# Patient Record
Sex: FEMALE | Race: WHITE | Employment: UNEMPLOYED | URBAN - METROPOLITAN AREA
[De-identification: names, ages, dates, MRNs, and addresses within clinical notes are randomized per-mention and may not be internally consistent; named-entity substitution may affect disease eponyms.]

---

## 2018-08-08 ENCOUNTER — HOSPITAL ENCOUNTER (OUTPATIENT)
Dept: NON INVASIVE DIAGNOSTICS | Age: 28
Discharge: HOME | End: 2018-08-08
Attending: INTERNAL MEDICINE | Admitting: INTERNAL MEDICINE

## 2018-08-08 DIAGNOSIS — M54.5 LOW BACK PAIN, UNSPECIFIED BACK PAIN LATERALITY, UNSPECIFIED CHRONICITY, WITH SCIATICA PRESENCE UNSPECIFIED: ICD-10-CM

## 2018-09-07 ENCOUNTER — HOSPITAL ENCOUNTER (OUTPATIENT)
Dept: MRI IMAGING | Age: 28
Discharge: HOME | End: 2018-09-07
Attending: INTERNAL MEDICINE | Admitting: INTERNAL MEDICINE

## 2018-09-07 DIAGNOSIS — M54.9 DORSALGIA: ICD-10-CM

## 2018-09-07 DIAGNOSIS — M54.9 BACK PAIN, UNSPECIFIED BACK LOCATION, UNSPECIFIED BACK PAIN LATERALITY, UNSPECIFIED CHRONICITY: ICD-10-CM

## 2018-09-12 ENCOUNTER — TELEPHONE (OUTPATIENT)
Dept: ORTHOPEDIC SURGERY | Age: 28
End: 2018-09-12

## 2018-09-26 ENCOUNTER — TELEPHONE (OUTPATIENT)
Dept: ORTHOPEDIC SURGERY | Age: 28
End: 2018-09-26

## 2018-09-26 ENCOUNTER — OFFICE VISIT (OUTPATIENT)
Dept: ORTHOPEDIC SURGERY | Age: 28
End: 2018-09-26
Payer: COMMERCIAL

## 2018-09-26 VITALS
SYSTOLIC BLOOD PRESSURE: 106 MMHG | HEART RATE: 74 BPM | WEIGHT: 166 LBS | HEIGHT: 64 IN | BODY MASS INDEX: 28.34 KG/M2 | DIASTOLIC BLOOD PRESSURE: 71 MMHG

## 2018-09-26 DIAGNOSIS — S32.2XXA CLOSED FRACTURE OF SACRUM AND COCCYX, INITIAL ENCOUNTER (HCC): Primary | ICD-10-CM

## 2018-09-26 DIAGNOSIS — S32.10XA CLOSED FRACTURE OF SACRUM AND COCCYX, INITIAL ENCOUNTER (HCC): Primary | ICD-10-CM

## 2018-09-26 PROCEDURE — 99243 OFF/OP CNSLTJ NEW/EST LOW 30: CPT | Performed by: NURSE PRACTITIONER

## 2018-09-26 PROCEDURE — G8419 CALC BMI OUT NRM PARAM NOF/U: HCPCS | Performed by: NURSE PRACTITIONER

## 2018-09-26 PROCEDURE — G8427 DOCREV CUR MEDS BY ELIG CLIN: HCPCS | Performed by: NURSE PRACTITIONER

## 2018-09-26 RX ORDER — IBUPROFEN 200 MG
600 TABLET ORAL 2 TIMES DAILY WITH MEALS
Qty: 60 TABLET | Refills: 1 | Status: SHIPPED | OUTPATIENT
Start: 2018-09-26 | End: 2018-10-27 | Stop reason: SDUPTHER

## 2018-09-26 RX ORDER — HYDROCODONE BITARTRATE AND ACETAMINOPHEN 5; 325 MG/1; MG/1
1 TABLET ORAL 2 TIMES DAILY PRN
Qty: 14 TABLET | Refills: 0 | Status: SHIPPED | OUTPATIENT
Start: 2018-09-26 | End: 2018-10-03

## 2018-10-27 ENCOUNTER — HOSPITAL ENCOUNTER (EMERGENCY)
Age: 28
Discharge: HOME | End: 2018-10-27
Attending: EMERGENCY MEDICINE
Payer: COMMERCIAL

## 2018-10-27 ENCOUNTER — APPOINTMENT (OUTPATIENT)
Dept: GENERAL RADIOLOGY | Age: 28
End: 2018-10-27
Payer: COMMERCIAL

## 2018-10-27 VITALS
HEART RATE: 60 BPM | DIASTOLIC BLOOD PRESSURE: 66 MMHG | WEIGHT: 170.42 LBS | OXYGEN SATURATION: 98 % | RESPIRATION RATE: 18 BRPM | HEIGHT: 64 IN | BODY MASS INDEX: 29.09 KG/M2 | TEMPERATURE: 97.8 F | SYSTOLIC BLOOD PRESSURE: 105 MMHG

## 2018-10-27 DIAGNOSIS — S80.02XA CONTUSION OF LEFT KNEE, INITIAL ENCOUNTER: ICD-10-CM

## 2018-10-27 DIAGNOSIS — M54.5 LOW BACK PAIN, UNSPECIFIED BACK PAIN LATERALITY, UNSPECIFIED CHRONICITY, WITH SCIATICA PRESENCE UNSPECIFIED: ICD-10-CM

## 2018-10-27 DIAGNOSIS — S20.219A CONTUSION, CHEST WALL, UNSPECIFIED LATERALITY, INITIAL ENCOUNTER: Primary | ICD-10-CM

## 2018-10-27 PROCEDURE — 71046 X-RAY EXAM CHEST 2 VIEWS: CPT

## 2018-10-27 PROCEDURE — 73562 X-RAY EXAM OF KNEE 3: CPT

## 2018-10-27 PROCEDURE — 99283 EMERGENCY DEPT VISIT LOW MDM: CPT

## 2018-10-27 ASSESSMENT — PAIN DESCRIPTION - LOCATION
LOCATION: RIB CAGE
LOCATION: RIB CAGE
LOCATION_2: KNEE
LOCATION_2: KNEE

## 2018-10-27 ASSESSMENT — PAIN DESCRIPTION - ORIENTATION
ORIENTATION_2: LEFT
ORIENTATION: LEFT
ORIENTATION_2: LEFT

## 2018-10-27 ASSESSMENT — PAIN DESCRIPTION - PAIN TYPE: TYPE: ACUTE PAIN

## 2018-10-27 ASSESSMENT — PAIN SCALES - GENERAL
PAINLEVEL_OUTOF10: 10
PAINLEVEL_OUTOF10: 10

## 2018-10-27 ASSESSMENT — PAIN DESCRIPTION - INTENSITY
RATING_2: 5
RATING_2: 5

## 2018-10-27 NOTE — ED TRIAGE NOTES
Last night she slipped on some wooden steps outside her porch. The steps were wet. She is having left sided rib pain in the front. Also having left knee pain on the medial aspect of her left knee. No bruising or impaired skin integrity of her left knee or chest wall below her sports bra.

## 2018-10-27 NOTE — ED PROVIDER NOTES
the ED, diagnosis, care, and prognosis. The plan is to discharge to home. Patient is in agreement with plan and questions have been answered. I also discussed with Shefali Leon the reasons which may require a return visit and the importance of follow-up care. The patient is well-appearing, nontoxic, and improved at the time of discharge. Patient agrees to call to arrange follow-up care as directed. Shefali Leon understands to return immediately for worsening/change in symptoms. Patient will be started on the following medications from the ED:  Discharge Medication List as of 10/27/2018  3:55 PM            Disposition  Pt is discharged in stable condition.     Disposition Vitals:  /66   Pulse 60   Temp 97.8 °F (36.6 °C) (Oral)   Resp 18   Ht 5' 4\" (1.626 m)   Wt 170 lb 6.7 oz (77.3 kg)   LMP 10/01/2018   SpO2 98%   BMI 29.25 kg/m²           Ludin Chanell,   10/27/18 [address] [facility],   11/05/18 [facility] -FS stable  -c/w Accuchecks ACHSTID   -c/w Diabetic consistent carb diet  -c/w Humalog sliding scale

## 2018-12-13 ENCOUNTER — HOSPITAL ENCOUNTER (OUTPATIENT)
Dept: ULTRASOUND IMAGING | Age: 28
Discharge: HOME | End: 2018-12-13
Payer: COMMERCIAL

## 2018-12-13 DIAGNOSIS — N92.0 MENORRHAGIA WITH REGULAR CYCLE: ICD-10-CM

## 2018-12-13 PROCEDURE — 76856 US EXAM PELVIC COMPLETE: CPT

## 2018-12-13 PROCEDURE — 76830 TRANSVAGINAL US NON-OB: CPT

## 2019-01-08 ENCOUNTER — HOSPITAL ENCOUNTER (OUTPATIENT)
Dept: ULTRASOUND IMAGING | Age: 29
Discharge: HOME | End: 2019-01-08
Payer: COMMERCIAL

## 2019-01-08 DIAGNOSIS — R10.84 GENERALIZED ABDOMINAL PAIN: ICD-10-CM

## 2019-01-08 PROCEDURE — 76700 US EXAM ABDOM COMPLETE: CPT

## 2019-06-22 ENCOUNTER — HOSPITAL ENCOUNTER (EMERGENCY)
Age: 29
Discharge: HOME | End: 2019-06-22
Payer: COMMERCIAL

## 2019-06-22 VITALS
SYSTOLIC BLOOD PRESSURE: 128 MMHG | DIASTOLIC BLOOD PRESSURE: 82 MMHG | HEART RATE: 74 BPM | RESPIRATION RATE: 16 BRPM | BODY MASS INDEX: 30.74 KG/M2 | HEIGHT: 63 IN | OXYGEN SATURATION: 100 % | TEMPERATURE: 98.7 F | WEIGHT: 173.5 LBS

## 2019-06-22 DIAGNOSIS — S61.412A LACERATION OF LEFT HAND WITHOUT FOREIGN BODY, INITIAL ENCOUNTER: Primary | ICD-10-CM

## 2019-06-22 PROCEDURE — 4500000022 HC ED LEVEL 2 PROCEDURE

## 2019-06-22 PROCEDURE — 99282 EMERGENCY DEPT VISIT SF MDM: CPT

## 2019-06-22 RX ORDER — CEPHALEXIN 500 MG/1
500 CAPSULE ORAL 3 TIMES DAILY
Qty: 21 CAPSULE | Refills: 0 | Status: SHIPPED | OUTPATIENT
Start: 2019-06-22 | End: 2019-06-29

## 2019-06-22 ASSESSMENT — PAIN DESCRIPTION - LOCATION
LOCATION: OTHER (COMMENT)
LOCATION: OTHER (COMMENT)

## 2019-06-22 ASSESSMENT — PAIN SCALES - GENERAL
PAINLEVEL_OUTOF10: 10
PAINLEVEL_OUTOF10: 10

## 2019-06-22 ASSESSMENT — PAIN DESCRIPTION - FREQUENCY
FREQUENCY: CONTINUOUS
FREQUENCY: CONTINUOUS

## 2019-06-22 ASSESSMENT — PAIN DESCRIPTION - DESCRIPTORS
DESCRIPTORS: SHARP
DESCRIPTORS: SHARP

## 2019-06-22 ASSESSMENT — PAIN DESCRIPTION - PAIN TYPE: TYPE: ACUTE PAIN

## 2019-06-22 ASSESSMENT — PAIN DESCRIPTION - ORIENTATION
ORIENTATION: LEFT
ORIENTATION: LEFT

## 2019-06-23 NOTE — ED NOTES
Laceration sutured per P.A. Discharge  Instructions given voiced understanding  velcro thumb splint placed per P.A. Instructions.  Pt said pain still [id number] [place] [address], RN  06/22/19 2036

## 2019-06-23 NOTE — ED PROVIDER NOTES
[address] [employer]      Pt Name: Jacqueline Holcomb  MRN: [id number]  [place] 1990  Date of evaluation: 6/22/2019  Provider: LEAH Bonner    The ED Attending Physician was available for consultation but did not see or evaluate this patient. CHIEF COMPLAINT       Chief Complaint   Patient presents with    Laceration     pt said she was putting down jo-ann  cut thumb with a razor       HISTORY OF PRESENT ILLNESS  (Location/Symptom, Timing/Onset, Context/Setting, Quality, Duration, Modifying Factors, Severity.)   Jacqueline Holcomb is a 33 y.o. female who presents to the emergency department with a complaint of a laceration to the left hand. Patient says that very shortly before arrival to the emergency department she cut her hand with the blade of a . She says there was a lot of bleeding, and the cut is deep, and is located on the palm of the hand around the base of the thumb. She says she put pressure on it but it still bleeding. She reports that she is unable to move the thumb, but it is not numb. Denies any prior history of significant injury, surgery or fracture to the affected hand. Denies injuries to any other parts of the body. Denies any relevant medical problems. Says her last tetanus vaccination was last year. No other complaints. Nursing Notes were reviewed and I agree. REVIEW OF SYSTEMS    (2-9 systems for level 4, 10 or more for level 5)     Constitutional:  Negative for fever, chills. Respiratory:  Negative for cough, shortness of breath. Cardiovascular:  Negative for chest pain, palpitations. Gastrointestinal:  Negative for nausea, vomiting, abdominal pain. Genitourinary:  Negative for dysuria, hematuria, flank pain, and pelvic pain. Musculoskeletal: Positive for left hand laceration. Negative for myalgias, neck pain and neck stiffness.    Neurological:  Negative for dizziness, weakness, light-headedness, numbness and headaches. Except as noted above the remainder of the review of systems was reviewed and negative. PAST MEDICAL HISTORY         Diagnosis Date    Anxiety     Depression        SURGICAL HISTORY           Procedure Laterality Date     SECTION      TUBAL LIGATION         CURRENT MEDICATIONS       Previous Medications    No medications on file       ALLERGIES     Patient has no known allergies. FAMILY HISTORY     History reviewed. No pertinent family history. No family status information on file. SOCIAL HISTORY      reports that she is a non-smoker but has been exposed to tobacco smoke. She has never used smokeless tobacco. She reports that she does not drink alcohol or use drugs. PHYSICAL EXAM    (up to 7 for level 4, 8 or more for level 5)     ED Triage Vitals [19 1915]   BP Temp Temp Source Pulse Resp SpO2 Height Weight   128/82 98.7 °F (37.1 °C) Oral 74 16 100 % 5' 3\" (1.6 m) 173 lb 8 oz (78.7 kg)       Constitutional:  Appearing well-developed and well-nourished. No distress. HENT:  Normocephalic and atraumatic. Cardiovascular:  Normal rate, regular rhythm, normal heart sounds and intact distal pulses. Pulmonary/Chest:  Effort normal and breath sounds normal. No respiratory distress. Musculoskeletal: Approximately 5 cm curved laceration noted to the left hand over the thenar eminence and the first MCP joint, with muscle involvement but negative for bony exposure. Nearly absent flexion and extension strength at both the MCP joint and the interphalangeal joint of the thumb. Sensation to light touch grossly intact and capillary refill <3 seconds in the left thumb. Neurological:  Oriented to person, place, and time. No cranial nerve deficit. Skin:  Skin is warm and dry. Not diaphoretic. Psychiatric:  Normal mood, affect, behavior, judgment and thought content.            DIAGNOSTIC RESULTS     RADIOLOGY:   Non-plain film images such as left hand, as noted above. The surrounding area was cleaned with an alcohol swab and anesthetized in a field block by injection with equal parts 1% lidocaine and 0.5% bupivicaine through a 27-gauge needle. The laceration was then thoroughly cleaned with surgical scrub, flushed with sterile saline, and sterilely draped. No foreign bodies were noted. The laceration was sutured with close approximation using 5-0 Prolene with 7 sutures in a simple interrupted technique. The patient tolerated the procedure well. The patient was given wound care instructions and advised to have the sutures removed in 7 days by a primary care provider, at this emergency department, or at an urgent care center, unless otherwise instructed by the hand specialist. The patient was also advised of the signs of infection such as fever, increased redness and swelling, or pus discharge that would warrant a return to the emergency department. FINAL IMPRESSION      1.  Laceration of left hand without foreign body, initial encounter          DISPOSITION/PLAN   DISPOSITION Decision To Discharge 06/22/2019 08:21:50 PM      PATIENT REFERRED TO:  Ed Mchugh MD  [address] [place] [address]  [phone]    Call in 2 days  For orthopedic hand specialty follow-up care      DISCHARGE MEDICATIONS:  New Prescriptions    CEPHALEXIN (KEFLEX) 500 MG CAPSULE    Take 1 capsule by mouth 3 times daily for 7 days       (Please note that portions of this note were completed with a voice recognition program.  Efforts were made to edit the dictations but occasionally words are mis-transcribed.)    Estephania Ramirez 86, [address] Donald Goodman  06/22/19 800 [facility], [facility] Donald Goodman  06/22/19 9416

## 2020-01-16 ENCOUNTER — APPOINTMENT (OUTPATIENT)
Dept: CT IMAGING | Age: 30
End: 2020-01-16
Payer: COMMERCIAL

## 2020-01-16 ENCOUNTER — HOSPITAL ENCOUNTER (EMERGENCY)
Age: 30
Discharge: HOME | End: 2020-01-16
Attending: EMERGENCY MEDICINE
Payer: COMMERCIAL

## 2020-01-16 VITALS
WEIGHT: 180.56 LBS | BODY MASS INDEX: 31.99 KG/M2 | HEART RATE: 60 BPM | SYSTOLIC BLOOD PRESSURE: 117 MMHG | OXYGEN SATURATION: 99 % | DIASTOLIC BLOOD PRESSURE: 62 MMHG | HEIGHT: 63 IN | RESPIRATION RATE: 14 BRPM | TEMPERATURE: 97.8 F

## 2020-01-16 LAB
ANION GAP SERPL CALCULATED.3IONS-SCNC: 10 MMOL/L (ref 3–16)
BASOPHILS ABSOLUTE: 0 K/UL (ref 0–0.2)
BASOPHILS RELATIVE PERCENT: 0.8 %
BUN BLDV-MCNC: 13 MG/DL (ref 7–20)
CALCIUM SERPL-MCNC: 9.2 MG/DL (ref 8.3–10.6)
CHLORIDE BLD-SCNC: 106 MMOL/L (ref 99–110)
CO2: 27 MMOL/L (ref 21–32)
CREAT SERPL-MCNC: 0.8 MG/DL (ref 0.6–1.1)
EOSINOPHILS ABSOLUTE: 0.5 K/UL (ref 0–0.6)
EOSINOPHILS RELATIVE PERCENT: 8.1 %
GFR AFRICAN AMERICAN: >60
GFR NON-AFRICAN AMERICAN: >60
GLUCOSE BLD-MCNC: 117 MG/DL (ref 70–99)
HCG QUALITATIVE: NEGATIVE
HCT VFR BLD CALC: 36.6 % (ref 36–48)
HEMOGLOBIN: 12.2 G/DL (ref 12–16)
LYMPHOCYTES ABSOLUTE: 1.2 K/UL (ref 1–5.1)
LYMPHOCYTES RELATIVE PERCENT: 21.2 %
MCH RBC QN AUTO: 30.5 PG (ref 26–34)
MCHC RBC AUTO-ENTMCNC: 33.3 G/DL (ref 31–36)
MCV RBC AUTO: 91.7 FL (ref 80–100)
MONOCYTES ABSOLUTE: 0.6 K/UL (ref 0–1.3)
MONOCYTES RELATIVE PERCENT: 10.3 %
NEUTROPHILS ABSOLUTE: 3.3 K/UL (ref 1.7–7.7)
NEUTROPHILS RELATIVE PERCENT: 59.6 %
PDW BLD-RTO: 12.8 % (ref 12.4–15.4)
PLATELET # BLD: 186 K/UL (ref 135–450)
PMV BLD AUTO: 8.9 FL (ref 5–10.5)
POTASSIUM SERPL-SCNC: 4 MMOL/L (ref 3.5–5.1)
RBC # BLD: 3.99 M/UL (ref 4–5.2)
SODIUM BLD-SCNC: 143 MMOL/L (ref 136–145)
WBC # BLD: 5.6 K/UL (ref 4–11)

## 2020-01-16 PROCEDURE — 6370000000 HC RX 637 (ALT 250 FOR IP): Performed by: EMERGENCY MEDICINE

## 2020-01-16 PROCEDURE — 85025 COMPLETE CBC W/AUTO DIFF WBC: CPT

## 2020-01-16 PROCEDURE — 6360000002 HC RX W HCPCS: Performed by: EMERGENCY MEDICINE

## 2020-01-16 PROCEDURE — 80048 BASIC METABOLIC PNL TOTAL CA: CPT

## 2020-01-16 PROCEDURE — 70450 CT HEAD/BRAIN W/O DYE: CPT

## 2020-01-16 PROCEDURE — 96375 TX/PRO/DX INJ NEW DRUG ADDON: CPT

## 2020-01-16 PROCEDURE — 36415 COLL VENOUS BLD VENIPUNCTURE: CPT

## 2020-01-16 PROCEDURE — 84703 CHORIONIC GONADOTROPIN ASSAY: CPT

## 2020-01-16 PROCEDURE — 96374 THER/PROPH/DIAG INJ IV PUSH: CPT

## 2020-01-16 PROCEDURE — 99284 EMERGENCY DEPT VISIT MOD MDM: CPT

## 2020-01-16 PROCEDURE — 2580000003 HC RX 258: Performed by: EMERGENCY MEDICINE

## 2020-01-16 PROCEDURE — 96361 HYDRATE IV INFUSION ADD-ON: CPT

## 2020-01-16 RX ORDER — 0.9 % SODIUM CHLORIDE 0.9 %
1000 INTRAVENOUS SOLUTION INTRAVENOUS ONCE
Status: COMPLETED | OUTPATIENT
Start: 2020-01-16 | End: 2020-01-16

## 2020-01-16 RX ORDER — METOCLOPRAMIDE HYDROCHLORIDE 5 MG/ML
5 INJECTION INTRAMUSCULAR; INTRAVENOUS ONCE
Status: COMPLETED | OUTPATIENT
Start: 2020-01-16 | End: 2020-01-16

## 2020-01-16 RX ORDER — BUTALBITAL, ACETAMINOPHEN AND CAFFEINE 50; 325; 40 MG/1; MG/1; MG/1
1 TABLET ORAL EVERY 6 HOURS PRN
Qty: 12 TABLET | Refills: 0 | Status: SHIPPED | OUTPATIENT
Start: 2020-01-16 | End: 2020-06-07

## 2020-01-16 RX ORDER — KETOROLAC TROMETHAMINE 30 MG/ML
15 INJECTION, SOLUTION INTRAMUSCULAR; INTRAVENOUS ONCE
Status: COMPLETED | OUTPATIENT
Start: 2020-01-16 | End: 2020-01-16

## 2020-01-16 RX ORDER — DIPHENHYDRAMINE HYDROCHLORIDE 50 MG/ML
12.5 INJECTION INTRAMUSCULAR; INTRAVENOUS ONCE
Status: COMPLETED | OUTPATIENT
Start: 2020-01-16 | End: 2020-01-16

## 2020-01-16 RX ORDER — BUTALBITAL, ACETAMINOPHEN AND CAFFEINE 50; 325; 40 MG/1; MG/1; MG/1
1 TABLET ORAL ONCE
Status: COMPLETED | OUTPATIENT
Start: 2020-01-16 | End: 2020-01-16

## 2020-01-16 RX ADMIN — BUTALBITAL, ACETAMINOPHEN, AND CAFFEINE 1 TABLET: 50; 325; 40 TABLET ORAL at 13:22

## 2020-01-16 RX ADMIN — METOCLOPRAMIDE 5 MG: 5 INJECTION, SOLUTION INTRAMUSCULAR; INTRAVENOUS at 12:05

## 2020-01-16 RX ADMIN — SODIUM CHLORIDE 1000 ML: 9 INJECTION, SOLUTION INTRAVENOUS at 12:05

## 2020-01-16 RX ADMIN — SODIUM CHLORIDE 1000 ML: 9 INJECTION, SOLUTION INTRAVENOUS at 13:09

## 2020-01-16 RX ADMIN — KETOROLAC TROMETHAMINE 15 MG: 30 INJECTION, SOLUTION INTRAMUSCULAR at 13:09

## 2020-01-16 RX ADMIN — DIPHENHYDRAMINE HYDROCHLORIDE 12.5 MG: 50 INJECTION, SOLUTION INTRAMUSCULAR; INTRAVENOUS at 12:05

## 2020-01-16 ASSESSMENT — PAIN SCALES - GENERAL
PAINLEVEL_OUTOF10: 9
PAINLEVEL_OUTOF10: 5
PAINLEVEL_OUTOF10: 9
PAINLEVEL_OUTOF10: 7
PAINLEVEL_OUTOF10: 5
PAINLEVEL_OUTOF10: 10

## 2020-01-16 ASSESSMENT — PAIN DESCRIPTION - ONSET: ONSET: GRADUAL

## 2020-01-16 ASSESSMENT — PAIN DESCRIPTION - PROGRESSION: CLINICAL_PROGRESSION: GRADUALLY WORSENING

## 2020-01-16 ASSESSMENT — PAIN DESCRIPTION - LOCATION: LOCATION: HEAD

## 2020-01-16 ASSESSMENT — PAIN DESCRIPTION - DESCRIPTORS: DESCRIPTORS: ACHING

## 2020-01-16 NOTE — ED NOTES
Pt d/c home with AVS no s.s of distress started, script in hand pain 5/10 pt agreeable with pain management      Jos Hinojosa RN  01/16/20 9881

## 2020-01-16 NOTE — ED PROVIDER NOTES
on file     Emotionally abused: Not on file     Physically abused: Not on file     Forced sexual activity: Not on file   Other Topics Concern    Not on file   Social History Narrative    Not on file     Current Facility-Administered Medications   Medication Dose Route Frequency Provider Last Rate Last Dose    0.9 % sodium chloride bolus  1,000 mL Intravenous Once Phill Schuster MD        metoclopramide (REGLAN) injection 5 mg  5 mg Intravenous Once Phill Schuster MD        diphenhydrAMINE (BENADRYL) injection 12.5 mg  12.5 mg Intravenous Once Phill Schuster MD         No current outpatient medications on file. No Known Allergies    [unfilled]    Nursing Notes Reviewed    Physical Exam:  Vitals:    01/16/20 1117   BP: 115/75   Pulse: 76   Resp: 14   Temp: 97.8 °F (36.6 °C)   SpO2: 97%       GENERAL APPEARANCE: Awake and alert. Cooperative. No acute distress. HEAD: Normocephalic. Atraumatic. EYES: EOM's grossly intact. Sclera anicteric. ENT: Mucous membranes are moist. Tolerates saliva. No trismus. NECK: Supple. No meningismus. Trachea midline. HEART: RRR. Radial pulses 2+. LUNGS: Respirations unlabored. CTAB  ABDOMEN: Soft. Non-tender. No guarding or rebound. EXTREMITIES: No acute deformities. SKIN: Warm and dry. NEUROLOGICAL: No gross facial drooping. Moves all 4 extremities spontaneously. PSYCHIATRIC: Normal mood. I have reviewed and interpreted all of the currently available lab results from this visit (if applicable):  No results found for this visit on 01/16/20.      Radiographs (if obtained):  [] The following radiograph was interpreted by myself in the absence of a radiologist:  [x] Radiologist's Report Reviewed:  Narrative   EXAMINATION:   CT OF THE HEAD WITHOUT CONTRAST  1/16/2020 12:05 pm       TECHNIQUE:   CT of the head was performed without the administration of intravenous   contrast. Dose modulation, iterative reconstruction, and/or weight based   adjustment of the mA/kV was utilized to reduce the radiation dose to as low   as reasonably achievable.       COMPARISON:   None.       HISTORY:   ORDERING SYSTEM PROVIDED HISTORY: HA       Reason for Exam: PT. C/O RADIATING RT. SIDE HEAD HA THAT RADIATES TO BACK RT. SIDE HEAD X 9 DAYS. DENIES INJURY TO HEAD   Acuity: Acute   Type of Exam: Initial   Relevant Medical/Surgical History: PT. DENIES ANY HX OF HEAD PROBLEMS, DENIES   ANY HX OF SURGERY TO HEAD, DENIES ANY HX OF CA       FINDINGS:   BRAIN/VENTRICLES: No acute intracranial hemorrhage, mass effect or midline   shift.  No abnormal extra-axial fluid collection.  The gray-white   differentiation is maintained without evidence of an acute infarct.  No   evidence of hydrocephalus.       ORBITS: The visualized portion of the orbits demonstrate no acute abnormality.       SINUSES: The visualized paranasal sinuses and mastoid air cells appear clear.       SOFT TISSUES/SKULL:  No acute abnormality of the visualized skull or soft   tissues.           Impression   Unremarkable noncontrast CT examination of the brain.             EKG (if obtained): (All EKG's are interpreted by myself in the absence of a cardiologist)  Initial EKG on my interpretation shows n/a    MDM:  Differential diagnosis: Headache, meningitis, ICH, SAH, pregnancy    Reglan/Benadryl and IV fluid given  Preg: neg   CBC/CMP wnl   CT head: no acute abn    Characteristics of this patient's headache a very benign. CT was done. It showed no acute abnormality of an emergent process. I told her that in order to complete the work-up a lumbar puncture would be recommended to rule out brain bleed and or subarachnoid hemorrhage. She declines this. She feels better after meds/IVF. I vocalized that by declining lumbar puncture there leaves a small chance that a life-threatening process can be present. She voices understanding of the joint decision making we will forego LP.   I estimate there is LOW risk for SUBARACHNOID HEMORRHAGE, MENINGITIS, INTRACRANIAL HEMORRHAGE, ENCEPHALITIS, TEMPORAL ARTERITIS, PSEUDOTUMOR CEREBIR, ACUTE GLAUCOMA, SUBDURAL OR EPIDURAL HEMATOMA, OR STROKE, PULMONARY EMBOLISM, ACUTE CORONARY SYNDROME, OR THORACIC AORTIC DISSECTION, thus I consider the discharge disposition reasonable. Discussed results, diagnosis and plan with patient and/or family. Questions addressed. Disposition and follow-up agreed upon. Specific discharge instructions explained. The patient and/or family and I have discussed the diagnosis and risks, and we agree with discharging home to follow-up with their primary care, specialist or referral doctor. In the event that medications were prescribed the risk profile of these medications were detailed expressly. We also discussed returning to the Emergency Department immediately if new or worsening symptoms occur. We have discussed the symptoms which are most concerning that necessitate immediate return.     Will DC with Fioricet prescription. Old records reviewed. Labs and imaging reviewed and results discussed with patient. Patient was given scripts for the following medications. I counseled patient how to take these medications. New Prescriptions    No medications on file         CRITICAL CARE TIME   Total Critical Care time was 0 minutes, excluding separately reportable procedures. There was a high probability of clinically significant/life threatening deterioration in the patient's condition which required my urgent intervention.       Clinical Impression:  Headache  (Please note that portions of this note may have been completed with a voice recognition program. Efforts were made to edit the dictations but occasionally words are mis-transcribed.)    Charmayne Matters, MD BB&MICHAEL Dsouza MD  01/16/20 2660

## 2020-01-16 NOTE — ED NOTES
Pt resting in bed no s.s of distress she denies questions at this time she has been updated on plan of care, she denies needing to use the restroom at this time      Too aHyes RN  01/16/20 0937

## 2020-06-07 ENCOUNTER — HOSPITAL ENCOUNTER (EMERGENCY)
Age: 30
Discharge: HOME | End: 2020-06-07
Payer: COMMERCIAL

## 2020-06-07 VITALS
OXYGEN SATURATION: 97 % | BODY MASS INDEX: 32.26 KG/M2 | HEART RATE: 88 BPM | HEIGHT: 64 IN | DIASTOLIC BLOOD PRESSURE: 74 MMHG | WEIGHT: 188.93 LBS | SYSTOLIC BLOOD PRESSURE: 112 MMHG | TEMPERATURE: 97.5 F | RESPIRATION RATE: 16 BRPM

## 2020-06-07 PROCEDURE — 99283 EMERGENCY DEPT VISIT LOW MDM: CPT

## 2020-06-07 RX ORDER — HYDROCODONE BITARTRATE AND ACETAMINOPHEN 5; 325 MG/1; MG/1
1 TABLET ORAL EVERY 6 HOURS PRN
Qty: 10 TABLET | Refills: 0 | Status: SHIPPED | OUTPATIENT
Start: 2020-06-07 | End: 2020-06-10

## 2020-06-07 RX ORDER — IBUPROFEN 800 MG/1
800 TABLET ORAL EVERY 8 HOURS PRN
Qty: 30 TABLET | Refills: 0 | Status: SHIPPED | OUTPATIENT
Start: 2020-06-07 | End: 2020-10-22

## 2020-06-07 ASSESSMENT — PAIN DESCRIPTION - LOCATION: LOCATION: LEG

## 2020-06-07 ASSESSMENT — PAIN DESCRIPTION - ORIENTATION: ORIENTATION: LEFT

## 2020-06-07 ASSESSMENT — PAIN DESCRIPTION - PAIN TYPE: TYPE: ACUTE PAIN

## 2020-06-07 ASSESSMENT — PAIN DESCRIPTION - FREQUENCY: FREQUENCY: CONTINUOUS

## 2020-06-07 ASSESSMENT — PAIN - FUNCTIONAL ASSESSMENT: PAIN_FUNCTIONAL_ASSESSMENT: 0-10

## 2020-06-07 ASSESSMENT — PAIN DESCRIPTION - DESCRIPTORS: DESCRIPTORS: ACHING

## 2020-06-07 ASSESSMENT — ENCOUNTER SYMPTOMS
SHORTNESS OF BREATH: 0
COLOR CHANGE: 0

## 2020-06-07 ASSESSMENT — PAIN SCALES - GENERAL
PAINLEVEL_OUTOF10: 10
PAINLEVEL_OUTOF10: 10

## 2020-06-07 NOTE — ED PROVIDER NOTES
**EVALUATED BY ADVANCED PRACTICE PROVIDER**        [address] ENCOUNTER      Pt Name: Aman Camarillo  WHQ:[id number]  [place] 1990  Date of evaluation: 2020  Provider: KESHIA Rios CNP      Chief Complaint:    Chief Complaint   Patient presents with    Knee Pain     L knee pain [id number], exacerbated x2days, pain 10/10       Nursing Notes, Past Medical Hx, Past Surgical Hx, Social Hx, Allergies, and Family Hx were all reviewed and agreed with or any disagreements were addressed in the HPI.    HPI:  (Location, Duration, Timing, Severity, Quality, Assoc Sx, Context, Modifying factors)  This is a  30 y.o. female who presents to the emergency department today complaining of a 2-year history of left medial knee pain. No recent or remote injury or trauma. She said it has been worse last 2 days and it started to swell. Pain is worse with ambulation and weightbearing and she rates the pain 10/10 when doing so. No skin changes. No bruising. No pain or swelling in her calf. She advised that in the past she was seen by urgent care for this and she was started on blood thinners for possible blood clot but never had an ultrasound done. No history of blood clots. PastMedical/Surgical History:      Diagnosis Date    Anxiety     Depression          Procedure Laterality Date     SECTION      TUBAL LIGATION         Medications:  Discharge Medication List as of 2020  7:10 PM            Review of Systems:  Review of Systems   Constitutional: Negative for chills, diaphoresis and fever. Respiratory: Negative for shortness of breath. Cardiovascular: Negative for chest pain. Musculoskeletal: Positive for arthralgias and joint swelling (left knee). Negative for myalgias. Skin: Negative for color change and rash. Allergic/Immunologic: Negative for immunocompromised state. Neurological: Negative for weakness and numbness. Hematological: Negative for adenopathy. Psychiatric/Behavioral: Negative for confusion. All other systems reviewed and are negative. Positives and Pertinent negatives as per HPI. Except as noted above in the ROS, problem specific ROS was completed and is negative. Physical Exam:  Physical Exam  Vitals signs and nursing note reviewed. Constitutional:       General: She is not in acute distress. Appearance: Normal appearance. She is well-developed. She is not toxic-appearing. HENT:      Head: Normocephalic and atraumatic. Eyes:      General: No scleral icterus. Conjunctiva/sclera: Conjunctivae normal.   Neck:      Musculoskeletal: Normal range of motion. Vascular: No JVD. Cardiovascular:      Rate and Rhythm: Normal rate and regular rhythm. Heart sounds: No murmur. No friction rub. No gallop. Pulmonary:      Effort: Pulmonary effort is normal. No respiratory distress. Breath sounds: Normal breath sounds. Abdominal:      Palpations: Abdomen is not rigid. Musculoskeletal:      Left knee: She exhibits decreased range of motion and swelling. She exhibits no deformity, no laceration and no erythema. Tenderness found. Legs:       Comments: Tenderness to the medial left knee. Mild swelling on the proximal portion of the lateral knee when compared to the right. No redness or warmth. Distal extremities pink and well perfused. Left DP pulse 2+. Calf is soft with 5/5 strength. Homans sign is negative. No neurovascular deficits. Skin:     General: Skin is warm and dry. Capillary Refill: Capillary refill takes less than 2 seconds. Findings: No erythema or rash. Neurological:      General: No focal deficit present. Mental Status: She is alert and oriented to person, place, and time. Cranial Nerves: Cranial nerves are intact. Sensory: Sensation is intact. Motor: Motor function is intact.    Psychiatric:         Mood and Affect: Mood DISCONTINUED MEDICATIONS:  Discharge Medication List as of 6/7/2020  7:10 PM      STOP taking these medications       butalbital-acetaminophen-caffeine (FIORICET, ESGIC) -40 MG per tablet Comments:   Reason for Stopping:                      (Please note the MDM and HPI sections of this note were completed with a voice recognition program.  Efforts were made to edit the dictations but occasionally words are mis-transcribed.)    Electronically signed, Gilford Medley, APRN - CNP,           Gilford Medley, APRN - CNP  06/07/20 9918

## 2020-06-09 ENCOUNTER — HOSPITAL ENCOUNTER (OUTPATIENT)
Dept: VASCULAR LAB | Age: 30
Discharge: HOME | End: 2020-06-09
Payer: COMMERCIAL

## 2020-06-09 PROCEDURE — 93971 EXTREMITY STUDY: CPT

## 2020-06-10 ENCOUNTER — OFFICE VISIT (OUTPATIENT)
Dept: ORTHOPEDIC SURGERY | Age: 30
End: 2020-06-10
Payer: COMMERCIAL

## 2020-06-10 VITALS — HEIGHT: 64 IN | BODY MASS INDEX: 32.1 KG/M2 | RESPIRATION RATE: 16 BRPM | TEMPERATURE: 98.2 F | WEIGHT: 188 LBS

## 2020-06-10 PROCEDURE — 99203 OFFICE O/P NEW LOW 30 MIN: CPT | Performed by: PHYSICIAN ASSISTANT

## 2020-06-10 PROCEDURE — L1832 KO ADJ JNT POS R SUP PRE CST: HCPCS | Performed by: PHYSICIAN ASSISTANT

## 2020-06-10 RX ORDER — METHYLPREDNISOLONE 4 MG/1
TABLET ORAL
Qty: 1 KIT | Refills: 0 | Status: SHIPPED | OUTPATIENT
Start: 2020-06-10 | End: 2020-06-16

## 2020-06-10 NOTE — PROGRESS NOTES
no visible deformities. No significant effusion. Mild popliteal fullness. Tenderness to palpation along medial hamstrings area and posterior fossa. Range of motion 0 to 125 degrees without significant discomfort through arc of motion. No patellar crepitus noted. No pain with patellar grind. Knee stable to varus and valgus stress as well as anterior and posterior drawer. Negative Yanely's. Strength 5 out of 5 knee extension and flexion. Some discomfort with resisted knee flexion. Calf soft, nontender. Sensation intact. X-rays: 4 view x-rays of the left knee done today reveal no acute osseous abnormalities, no significant joint space narrowing    Impression: 25-year-old female with left knee pain. Chronic hamstring sprain versus MCL sprain      Plan:   - Discussed exam and imaging findings with patient   - Medrol Dosepak prescribed to patient's pharmacy  - Patient placed in short runner knee brace  - Prescription for physical therapy provided  - Tylenol and/or NSAIDs over the counter as needed for pain.   Recommended discontinuing Vicodin as soon as feasible  - Recommend ice, rest, activity modification   - All questions answered and patient in agreement with treatment plan  - Patient will follow up in 8 week(s) for repeat evaluation and x-rays of the left knee

## 2020-06-11 ENCOUNTER — TELEPHONE (OUTPATIENT)
Dept: ORTHOPEDIC SURGERY | Age: 30
End: 2020-06-11

## 2020-10-22 ENCOUNTER — HOSPITAL ENCOUNTER (EMERGENCY)
Age: 30
Discharge: HOME | End: 2020-10-22
Attending: EMERGENCY MEDICINE
Payer: COMMERCIAL

## 2020-10-22 VITALS
SYSTOLIC BLOOD PRESSURE: 109 MMHG | BODY MASS INDEX: 30.51 KG/M2 | DIASTOLIC BLOOD PRESSURE: 77 MMHG | HEIGHT: 63 IN | HEART RATE: 79 BPM | TEMPERATURE: 98.9 F | WEIGHT: 172.18 LBS | RESPIRATION RATE: 20 BRPM | OXYGEN SATURATION: 98 %

## 2020-10-22 LAB
RAPID INFLUENZA  B AGN: NEGATIVE
RAPID INFLUENZA A AGN: NEGATIVE

## 2020-10-22 PROCEDURE — 99282 EMERGENCY DEPT VISIT SF MDM: CPT

## 2020-10-22 PROCEDURE — U0003 INFECTIOUS AGENT DETECTION BY NUCLEIC ACID (DNA OR RNA); SEVERE ACUTE RESPIRATORY SYNDROME CORONAVIRUS 2 (SARS-COV-2) (CORONAVIRUS DISEASE [COVID-19]), AMPLIFIED PROBE TECHNIQUE, MAKING USE OF HIGH THROUGHPUT TECHNOLOGIES AS DESCRIBED BY CMS-2020-01-R: HCPCS

## 2020-10-22 PROCEDURE — 87804 INFLUENZA ASSAY W/OPTIC: CPT

## 2020-10-22 PROCEDURE — U0002 COVID-19 LAB TEST NON-CDC: HCPCS

## 2020-10-22 ASSESSMENT — PAIN DESCRIPTION - LOCATION: LOCATION: THROAT

## 2020-10-22 ASSESSMENT — PAIN DESCRIPTION - PAIN TYPE: TYPE: ACUTE PAIN

## 2020-10-22 ASSESSMENT — PAIN SCALES - GENERAL: PAINLEVEL_OUTOF10: 9

## 2020-10-22 ASSESSMENT — PAIN DESCRIPTION - FREQUENCY: FREQUENCY: CONTINUOUS

## 2020-10-22 ASSESSMENT — PAIN DESCRIPTION - DESCRIPTORS: DESCRIPTORS: SHARP

## 2020-10-22 NOTE — ED PROVIDER NOTES
[address] [facility]  eMERGENCY dEPARTMENTMinneapolis VA Health Care SystemOUnter      Pt Name: Alejandro Power  MRN: [id number]  [place] 1990  Date of evaluation: 10/21/2020  Provider: Keyonna Neely MD    [address] [place] [address]       Chief Complaint   Patient presents with    Pharyngitis     2 days         HISTORY OF PRESENT ILLNESS   (Location/Symptom, Timing/Onset,Context/Setting, Quality, Duration, Modifying Factors, Severity)  Note limiting factors. Alejandro Power is a 30 y.o. female who presents to the emergency department for 2 days of sore throat and fever. 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 No shortness of breath no chest pain no underlying medical problems. Nursing notes were reviewed. REVIEW OF SYSTEMS    (2-9 systems for level 4, 10 or more for level 5)     Review of Systems    Positive and pertinent negative as per HPI. Except as noted above in the ROS, all other systems were reviewed and were negative. PAST MEDICAL HISTORY     Past Medical History:   Diagnosis Date    Anxiety     Depression          SURGICALHISTORY       Past Surgical History:   Procedure Laterality Date     SECTION      TUBAL LIGATION           CURRENT MEDICATIONS       Discharge Medication List as of 10/22/2020  1:44 AM          ALLERGIES     Patient has no known allergies. FAMILY HISTORY     History reviewed. No pertinent family history.        SOCIAL HISTORY       Social History     Socioeconomic History    Marital status: Single     Spouse name: None    Number of children: None    Years of education: None    Highest education level: None   Occupational History    None   Social Needs    Financial resource strain: None    Food insecurity     Worry: None     Inability: None    Transportation needs     Medical: None     Non-medical: None   Tobacco Use    Smoking status: Passive Smoke Exposure - Never Smoker    Smokeless tobacco: Never Used   Substance and Sexual Activity    Alcohol use: No    Drug use: No    Sexual activity: None   Lifestyle    Physical activity     Days per week: None     Minutes per session: None    Stress: None   Relationships    Social connections     Talks on phone: None     Gets together: None     Attends Restorationism service: None     Active member of club or organization: None     Attends meetings of clubs or organizations: None     Relationship status: None    Intimate partner violence     Fear of current or ex partner: None     Emotionally abused: None     Physically abused: None     Forced sexual activity: None   Other Topics Concern    None   Social History Narrative    None       SCREENINGS             PHYSICAL EXAM    (up to 7 for level 4, 8 or more for level 5)     ED Triage Vitals [10/22/20 0009]   BP Temp Temp Source Pulse Resp SpO2 Height Weight   109/77 98.9 °F (37.2 °C) Oral 79 20 98 % 5' 3\" (1.6 m) 172 lb 2.9 oz (78.1 kg)       Physical Exam  Vitals signs and nursing note reviewed. Constitutional:       Appearance: Normal appearance. She is well-developed. She is not ill-appearing. HENT:      Head: Normocephalic and atraumatic. Right Ear: External ear normal.      Left Ear: External ear normal.      Nose: Nose normal.      Mouth/Throat:      Mouth: Mucous membranes are moist.   Eyes:      General: No scleral icterus. Right eye: No discharge. Left eye: No discharge. Conjunctiva/sclera: Conjunctivae normal.   Neck:      Musculoskeletal: Neck supple. Cardiovascular:      Rate and Rhythm: Normal rate and regular rhythm. Heart sounds: Normal heart sounds. Pulmonary:      Effort: Pulmonary effort is normal. No respiratory distress. Breath sounds: Normal breath sounds. No wheezing or rales. Abdominal:      General: Bowel sounds are normal.   Lymphadenopathy:      Cervical: No cervical adenopathy. Skin:     Coloration: Skin is not pale.    Neurological:      Mental Status: She is alert.   Psychiatric:         Mood and Affect: Mood normal.         Behavior: Behavior normal.             DIAGNOSTIC RESULTS     EKG: All EKG's are interpreted by the Emergency Department Physician who either signs or Co-signs this chart in the absence of a cardiologist.    12 lead EKG shows    RADIOLOGY:   Non-plain film images such as CT, Ultrasound and MRI are read by the radiologist. Plain radiographic images are visualized and preliminarily interpreted by the emergency physician with the below findings:        Interpretation per the Radiologist below, if available at the time of this note:    No orders to display         ED BEDSIDE ULTRASOUND:   Performed by ED Physician - none    LABS:  Labs Reviewed   RAPID INFLUENZA A/B ANTIGENS    Narrative:     Performed at:  [facility]  [address] [place], [facility]   Phone [phone]       All other labs were within normal range or not returned as of this dictation. EMERGENCY DEPARTMENT COURSE and DIFFERENTIAL DIAGNOSIS/MDM:   Vitals:    Vitals:    10/22/20 0009   BP: 109/77   Pulse: 79   Resp: 20   Temp: 98.9 °F (37.2 °C)   TempSrc: Oral   SpO2: 98%   Weight: 172 lb 2.9 oz (78.1 kg)   Height: 5' 3\" (1.6 m)       Adult female with URI symptoms. .  No shortness of breath vitals stable. Rapid flu and COVID-19 test ordered. Rapid flu negative. Patient been counseled on things that should prompt immediate return to the emergency room otherwise over-the-counter medications recommended as well as self monitoring and isolation. Patient expresses understanding and is agreeable to treatment plan. CRITICAL CARE TIME   None       CONSULTS:  None    PROCEDURES:  Unless otherwise noted above, none     Procedures    FINAL IMPRESSION      1.  Suspected COVID-19 virus infection          DISPOSITION/PLAN   DISPOSITION Decision To Discharge 10/22/2020 01:36:38 AM      PATIENT REFERREDTO:  Shoaib Jean Baptiste  [address] [facility]    In 1 week      [address] [facility]  [address]  [phone]    If symptoms worsen      DISCHARGEMEDICATIONS:  Discharge Medication List as of 10/22/2020  1:44 AM             (Please note that portions of this note were completed with a voice recognition program.  Efforts were made to edit the dictations but occasionally words are mis-transcribed.)    Kelvin Gunter MD (electronically signed)  Attending Emergency Physician       Kelvin Gunter MD  10/22/20 9298

## 2020-10-22 NOTE — ED NOTES
Sore throat for 2 days. States it hurts to swallow. Denies fever.      Norberto Perry RN  10/22/20 5798

## 2020-10-23 LAB — SARS-COV-2, PCR: NOT DETECTED

## 2021-08-24 ENCOUNTER — APPOINTMENT (OUTPATIENT)
Dept: GENERAL RADIOLOGY | Age: 31
End: 2021-08-24
Payer: COMMERCIAL

## 2021-08-24 ENCOUNTER — HOSPITAL ENCOUNTER (EMERGENCY)
Age: 31
Discharge: HOME | End: 2021-08-24
Payer: COMMERCIAL

## 2021-08-24 VITALS
TEMPERATURE: 98.6 F | OXYGEN SATURATION: 100 % | DIASTOLIC BLOOD PRESSURE: 60 MMHG | HEIGHT: 63 IN | HEART RATE: 87 BPM | RESPIRATION RATE: 14 BRPM | WEIGHT: 144.18 LBS | BODY MASS INDEX: 25.55 KG/M2 | SYSTOLIC BLOOD PRESSURE: 110 MMHG

## 2021-08-24 DIAGNOSIS — S60.222A CONTUSION OF LEFT HAND, INITIAL ENCOUNTER: Primary | ICD-10-CM

## 2021-08-24 DIAGNOSIS — S69.92XA HAND INJURY, LEFT, INITIAL ENCOUNTER: ICD-10-CM

## 2021-08-24 PROCEDURE — 6370000000 HC RX 637 (ALT 250 FOR IP): Performed by: PHYSICIAN ASSISTANT

## 2021-08-24 PROCEDURE — 99284 EMERGENCY DEPT VISIT MOD MDM: CPT

## 2021-08-24 PROCEDURE — 73130 X-RAY EXAM OF HAND: CPT

## 2021-08-24 RX ORDER — IBUPROFEN 600 MG/1
600 TABLET ORAL ONCE
Status: COMPLETED | OUTPATIENT
Start: 2021-08-24 | End: 2021-08-24

## 2021-08-24 RX ORDER — IBUPROFEN 800 MG/1
800 TABLET ORAL EVERY 8 HOURS PRN
Qty: 30 TABLET | Refills: 0 | Status: ON HOLD | OUTPATIENT
Start: 2021-08-24 | End: 2022-05-18

## 2021-08-24 RX ADMIN — IBUPROFEN 600 MG: 600 TABLET ORAL at 14:55

## 2021-08-24 ASSESSMENT — PAIN DESCRIPTION - DESCRIPTORS: DESCRIPTORS: SHARP;NUMBNESS

## 2021-08-24 ASSESSMENT — PAIN DESCRIPTION - PAIN TYPE: TYPE: ACUTE PAIN

## 2021-08-24 ASSESSMENT — ENCOUNTER SYMPTOMS
NAUSEA: 0
VOMITING: 0

## 2021-08-24 ASSESSMENT — PAIN SCALES - GENERAL
PAINLEVEL_OUTOF10: 9
PAINLEVEL_OUTOF10: 9

## 2021-08-24 ASSESSMENT — PAIN DESCRIPTION - ORIENTATION: ORIENTATION: LEFT

## 2021-08-24 ASSESSMENT — PAIN DESCRIPTION - LOCATION: LOCATION: HAND;FINGER (COMMENT WHICH ONE)

## 2021-08-24 NOTE — ED PROVIDER NOTES
[address] [facility] [address]  Dept: [phone]  Loc: [phone]  eMERGENCYdEPARTMENT eNCOUnter      Pt Name: Paola Connelly  MRN: [id number]  [place] 1990  Date of evaluation: 8/24/2021  Provider:Susana Paredes PA-C    CHIEF COMPLAINT       Chief Complaint   Patient presents with    Hand Injury     30 minutes pta, pt L hand crushed between dresser and railing while moving dresser. CRITICAL CARE TIME   Total Critical Care time was 0 minutes, excluding separately reportable procedures. There was a high probability of clinically significant/life threatening deterioration in the patient's condition which required my urgentintervention. HISTORY OF PRESENT ILLNESS  (Location/Symptom, Timing/Onset, Context/Setting, Quality, Duration,Modifying Factors, Severity.)   Paola Connelly is a 35 y.o. female who presents to the emergency department by private vehicle complaining of left hand injury. Patient was carrying a dresser and her friend dropped the other end. The dresser dropped and patients hand was briefly between a railing and dresser. This happened 30 PTA. Patient has not taken anything for pain. Pain worsens with movement. No other injuries sustained. She is right hand dominant. Nursing Notes were reviewedand agreed with or any disagreements were addressed in the HPI. REVIEW OF SYSTEMS    (2-9 systems for level 4, 10 or more for level 5)     Review of Systems   Constitutional: Negative for chills and fever. HENT: Negative. Gastrointestinal: Negative for nausea and vomiting. Genitourinary: Negative. Negative for flank pain. Musculoskeletal: Positive for arthralgias. Negative for myalgias. Skin: Negative. Neurological: Negative. Psychiatric/Behavioral: Negative. Except as noted above the remainder of the review of systems was reviewed and negative.        PAST MEDICAL HISTORY Diagnosis Date    Anxiety     Depression        SURGICAL HISTORY           Procedure Laterality Date     SECTION      TUBAL LIGATION         CURRENT MEDICATIONS     [unfilled]    ALLERGIES     Patient has no known allergies. FAMILY HISTORY     History reviewed. No pertinent family history. No family status information on file. SOCIAL HISTORY      reports that she is a non-smoker but has been exposed to tobacco smoke. She has never used smokeless tobacco. She reports that she does not drink alcohol and does not use drugs. PHYSICAL EXAM    (up to 7 for level 4, 8 or more for level 5)     ED Triage Vitals [21 1356]   Enc Vitals Group      /60      Pulse 87      Resp 14      Temp 98.6 °F (37 °C)      Temp Source Oral      SpO2 100 %      Weight 144 lb 2.9 oz (65.4 kg)      Height 5' 3\" (1.6 m)      Head Circumference       Peak Flow       Pain Score       Pain Loc       Pain Edu? Excl. in [address]? Physical Exam  Vitals reviewed. Constitutional:       Appearance: Normal appearance. HENT:      Head: Normocephalic and atraumatic. Pulmonary:      Effort: Pulmonary effort is normal. No respiratory distress. Musculoskeletal:      Cervical back: Normal range of motion and neck supple. Comments: LUE: TTP along 4th and 5th metacarpals, mild ecchymosis and edema present. No tenderness throughout carpals or wrist. No obvious deformity. Limited flexion to digits 4 and 5 throughout, due to pain. Able to flex/extend at each joint. Capillary refill <3 seconds. Radial pulse +2. Sensation intact distally. Skin:     General: Skin is warm. Neurological:      General: No focal deficit present. Mental Status: She is alert and oriented to person, place, and time.    Psychiatric:         Mood and Affect: Mood normal.         Behavior: Behavior normal.           DIAGNOSTIC RESULTS     EKG: All EKG's are interpreted by the Emergency Department Physician who either signs or Co-signs this chart in the absence of a cardiologist.    RADIOLOGY:   Non-plain film images such as CT, Ultrasound and MRI are read by the radiologist. Plain radiographic images are visualized and preliminarilyinterpreted by the emergency physician with the below findings:    Interpretation per the Radiologist below,if available at the time of this note:    XR HAND LEFT (MIN 3 VIEWS)   Final Result   Negative           Xray reviewed by myself, [facility], and showed no fracture or dislocation seen. LABS:  Labs Reviewed - No data to display    All other labs were within normal range or not returned as of this dictation. EMERGENCY DEPARTMENT COURSE and DIFFERENTIAL DIAGNOSIS/MDM:   Vitals:    Vitals:    08/24/21 1356   BP: 110/60   Pulse: 87   Resp: 14   Temp: 98.6 °F (37 °C)   TempSrc: Oral   SpO2: 100%   Weight: 144 lb 2.9 oz (65.4 kg)   Height: 5' 3\" (1.6 m)       MDM     Patient presents to the ED with the HPI noted above. Xray obtained and showed no fracture. X-ray reviewed by myself. Patient given ibuprofen in ED. Ice applied. Patient provided splint for protection and comfort. Encourage mobility. Patient told to rest, ice, elevate. She is given ibuprofen for pain inflammation. Patient follow-up PCP in 1 week for reevaluation. If no improvement or worsening follow-up with orthopedist.  She is comfortable plan. She was discharged home in stable condition. The patient tolerated their visit well. I saw the patient independently with physician available for consultation as needed. I have discussed the findings of today's workup with the patient and addressed the patient's questions and concerns. Important warning signs as well as new or worsening symptoms which would necessitate immediate return to the ED were discussed. The plan is to discharge from the ED at this time, and the patient is in stable condition.  The patient acknowledged understanding is agreeable with

## 2021-08-24 NOTE — ED NOTES
Influenza vaccine offered but not given today because:  The parent/guardian refuses influenza vaccination   Velcro wrist splint applied to left hand/wrist     Kyrie Oconnell RN  08/24/21 2373

## 2022-05-15 ENCOUNTER — HOSPITAL ENCOUNTER (EMERGENCY)
Age: 32
Discharge: LEFT BEFORE DISCHARGE | End: 2022-05-15

## 2022-05-16 NOTE — ED NOTES
Called x3, no show. Bathrooms searched, staff notified.   Patient had left before RN triage       Kassandra Wasserman RN  05/15/22 0021

## 2022-05-16 NOTE — ED NOTES
Pt's first and last name was said 3 times, no answer     [facility]  05/15/22 2050

## 2022-05-17 ENCOUNTER — HOSPITAL ENCOUNTER (INPATIENT)
Age: 32
LOS: 5 days | Discharge: HOME | DRG: 230 | End: 2022-05-23
Attending: EMERGENCY MEDICINE | Admitting: SURGERY
Payer: COMMERCIAL

## 2022-05-17 DIAGNOSIS — R79.89 PRERENAL AZOTEMIA: ICD-10-CM

## 2022-05-17 DIAGNOSIS — K56.609 INTESTINAL OBSTRUCTION, UNSPECIFIED CAUSE, UNSPECIFIED WHETHER PARTIAL OR COMPLETE (HCC): ICD-10-CM

## 2022-05-17 DIAGNOSIS — K56.609 SMALL BOWEL OBSTRUCTION (HCC): Primary | ICD-10-CM

## 2022-05-17 DIAGNOSIS — K64.5 THROMBOSED EXTERNAL HEMORRHOID: ICD-10-CM

## 2022-05-17 PROCEDURE — 84703 CHORIONIC GONADOTROPIN ASSAY: CPT

## 2022-05-17 PROCEDURE — 96361 HYDRATE IV INFUSION ADD-ON: CPT

## 2022-05-17 PROCEDURE — 83735 ASSAY OF MAGNESIUM: CPT

## 2022-05-17 PROCEDURE — 80307 DRUG TEST PRSMV CHEM ANLYZR: CPT

## 2022-05-17 PROCEDURE — 6360000002 HC RX W HCPCS: Performed by: EMERGENCY MEDICINE

## 2022-05-17 PROCEDURE — 36415 COLL VENOUS BLD VENIPUNCTURE: CPT

## 2022-05-17 PROCEDURE — 2580000003 HC RX 258: Performed by: EMERGENCY MEDICINE

## 2022-05-17 PROCEDURE — 96374 THER/PROPH/DIAG INJ IV PUSH: CPT

## 2022-05-17 PROCEDURE — 96375 TX/PRO/DX INJ NEW DRUG ADDON: CPT

## 2022-05-17 PROCEDURE — 85025 COMPLETE CBC W/AUTO DIFF WBC: CPT

## 2022-05-17 PROCEDURE — 80053 COMPREHEN METABOLIC PANEL: CPT

## 2022-05-17 PROCEDURE — 99285 EMERGENCY DEPT VISIT HI MDM: CPT

## 2022-05-17 PROCEDURE — 83690 ASSAY OF LIPASE: CPT

## 2022-05-17 PROCEDURE — 81001 URINALYSIS AUTO W/SCOPE: CPT

## 2022-05-17 RX ORDER — ONDANSETRON 2 MG/ML
4 INJECTION INTRAMUSCULAR; INTRAVENOUS
Status: DISCONTINUED | OUTPATIENT
Start: 2022-05-17 | End: 2022-05-18 | Stop reason: SDUPTHER

## 2022-05-17 RX ORDER — SODIUM CHLORIDE, SODIUM LACTATE, POTASSIUM CHLORIDE, AND CALCIUM CHLORIDE .6; .31; .03; .02 G/100ML; G/100ML; G/100ML; G/100ML
1000 INJECTION, SOLUTION INTRAVENOUS ONCE
Status: COMPLETED | OUTPATIENT
Start: 2022-05-17 | End: 2022-05-18

## 2022-05-17 RX ORDER — KETOROLAC TROMETHAMINE 15 MG/ML
15 INJECTION, SOLUTION INTRAMUSCULAR; INTRAVENOUS ONCE
Status: COMPLETED | OUTPATIENT
Start: 2022-05-17 | End: 2022-05-17

## 2022-05-17 RX ADMIN — KETOROLAC TROMETHAMINE 15 MG: 15 INJECTION, SOLUTION INTRAMUSCULAR; INTRAVENOUS at 23:41

## 2022-05-17 RX ADMIN — SODIUM CHLORIDE, POTASSIUM CHLORIDE, SODIUM LACTATE AND CALCIUM CHLORIDE 1000 ML: 600; 310; 30; 20 INJECTION, SOLUTION INTRAVENOUS at 23:46

## 2022-05-17 ASSESSMENT — PAIN SCALES - GENERAL: PAINLEVEL_OUTOF10: 10

## 2022-05-18 ENCOUNTER — APPOINTMENT (OUTPATIENT)
Dept: GENERAL RADIOLOGY | Age: 32
DRG: 230 | End: 2022-05-18
Payer: COMMERCIAL

## 2022-05-18 ENCOUNTER — APPOINTMENT (OUTPATIENT)
Dept: CT IMAGING | Age: 32
DRG: 230 | End: 2022-05-18
Payer: COMMERCIAL

## 2022-05-18 PROBLEM — K56.609 SMALL BOWEL OBSTRUCTION (HCC): Status: ACTIVE | Noted: 2022-05-18

## 2022-05-18 LAB
A/G RATIO: 2 (ref 1.1–2.2)
ALBUMIN SERPL-MCNC: 5 G/DL (ref 3.4–5)
ALP BLD-CCNC: 56 U/L (ref 40–129)
ALT SERPL-CCNC: 34 U/L (ref 10–40)
AMPHETAMINE SCREEN, URINE: ABNORMAL
ANION GAP SERPL CALCULATED.3IONS-SCNC: 19 MMOL/L (ref 3–16)
AST SERPL-CCNC: 33 U/L (ref 15–37)
BARBITURATE SCREEN URINE: ABNORMAL
BASOPHILS ABSOLUTE: 0 K/UL (ref 0–0.2)
BASOPHILS RELATIVE PERCENT: 0.2 %
BENZODIAZEPINE SCREEN, URINE: ABNORMAL
BILIRUB SERPL-MCNC: 1.3 MG/DL (ref 0–1)
BILIRUBIN URINE: ABNORMAL
BLOOD, URINE: ABNORMAL
BUN BLDV-MCNC: 24 MG/DL (ref 7–20)
CALCIUM SERPL-MCNC: 9.7 MG/DL (ref 8.3–10.6)
CANNABINOID SCREEN URINE: ABNORMAL
CHLORIDE BLD-SCNC: 92 MMOL/L (ref 99–110)
CLARITY: CLEAR
CO2: 27 MMOL/L (ref 21–32)
COCAINE METABOLITE SCREEN URINE: POSITIVE
COLOR: YELLOW
CREAT SERPL-MCNC: 0.7 MG/DL (ref 0.6–1.1)
EOSINOPHILS ABSOLUTE: 0 K/UL (ref 0–0.6)
EOSINOPHILS RELATIVE PERCENT: 0.2 %
GFR AFRICAN AMERICAN: >60
GFR NON-AFRICAN AMERICAN: >60
GLUCOSE BLD-MCNC: 112 MG/DL (ref 70–99)
GLUCOSE URINE: 100 MG/DL
HCG QUALITATIVE: NEGATIVE
HCT VFR BLD CALC: 43.7 % (ref 36–48)
HEMOGLOBIN: 15.3 G/DL (ref 12–16)
KETONES, URINE: ABNORMAL MG/DL
LACTIC ACID: 0.8 MMOL/L (ref 0.4–2)
LEUKOCYTE ESTERASE, URINE: NEGATIVE
LIPASE: 9 U/L (ref 13–60)
LYMPHOCYTES ABSOLUTE: 0.4 K/UL (ref 1–5.1)
LYMPHOCYTES RELATIVE PERCENT: 3.1 %
Lab: ABNORMAL
MAGNESIUM: 2.4 MG/DL (ref 1.8–2.4)
MCH RBC QN AUTO: 31 PG (ref 26–34)
MCHC RBC AUTO-ENTMCNC: 35 G/DL (ref 31–36)
MCV RBC AUTO: 88.4 FL (ref 80–100)
METHADONE SCREEN, URINE: ABNORMAL
MICROSCOPIC EXAMINATION: YES
MONOCYTES ABSOLUTE: 1.2 K/UL (ref 0–1.3)
MONOCYTES RELATIVE PERCENT: 9.1 %
NEUTROPHILS ABSOLUTE: 11.5 K/UL (ref 1.7–7.7)
NEUTROPHILS RELATIVE PERCENT: 87.4 %
NITRITE, URINE: POSITIVE
OPIATE SCREEN URINE: ABNORMAL
OXYCODONE URINE: ABNORMAL
PDW BLD-RTO: 12.7 % (ref 12.4–15.4)
PH UA: 6
PH UA: 6 (ref 5–8)
PHENCYCLIDINE SCREEN URINE: ABNORMAL
PLATELET # BLD: 227 K/UL (ref 135–450)
PMV BLD AUTO: 9.8 FL (ref 5–10.5)
POTASSIUM REFLEX MAGNESIUM: 3.5 MMOL/L (ref 3.5–5.1)
PROPOXYPHENE SCREEN: ABNORMAL
PROTEIN UA: 100 MG/DL
RBC # BLD: 4.95 M/UL (ref 4–5.2)
SODIUM BLD-SCNC: 138 MMOL/L (ref 136–145)
SPECIFIC GRAVITY UA: >=1.03 (ref 1–1.03)
TOTAL PROTEIN: 7.5 G/DL (ref 6.4–8.2)
URINE TYPE: ABNORMAL
UROBILINOGEN, URINE: 4 E.U./DL
WBC # BLD: 13.1 K/UL (ref 4–11)

## 2022-05-18 PROCEDURE — C9113 INJ PANTOPRAZOLE SODIUM, VIA: HCPCS | Performed by: SURGERY

## 2022-05-18 PROCEDURE — 6360000002 HC RX W HCPCS: Performed by: EMERGENCY MEDICINE

## 2022-05-18 PROCEDURE — 2580000003 HC RX 258: Performed by: SURGERY

## 2022-05-18 PROCEDURE — APPNB180 APP NON BILLABLE TIME > 60 MINS: Performed by: PHYSICIAN ASSISTANT

## 2022-05-18 PROCEDURE — 6360000002 HC RX W HCPCS: Performed by: SURGERY

## 2022-05-18 PROCEDURE — 36415 COLL VENOUS BLD VENIPUNCTURE: CPT

## 2022-05-18 PROCEDURE — APPSS180 APP SPLIT SHARED TIME > 60 MINUTES: Performed by: PHYSICIAN ASSISTANT

## 2022-05-18 PROCEDURE — 71045 X-RAY EXAM CHEST 1 VIEW: CPT

## 2022-05-18 PROCEDURE — 74019 RADEX ABDOMEN 2 VIEWS: CPT

## 2022-05-18 PROCEDURE — 94760 N-INVAS EAR/PLS OXIMETRY 1: CPT

## 2022-05-18 PROCEDURE — 1200000000 HC SEMI PRIVATE

## 2022-05-18 PROCEDURE — 2580000003 HC RX 258: Performed by: EMERGENCY MEDICINE

## 2022-05-18 PROCEDURE — 6370000000 HC RX 637 (ALT 250 FOR IP): Performed by: EMERGENCY MEDICINE

## 2022-05-18 PROCEDURE — 83605 ASSAY OF LACTIC ACID: CPT

## 2022-05-18 PROCEDURE — 99222 1ST HOSP IP/OBS MODERATE 55: CPT | Performed by: SURGERY

## 2022-05-18 PROCEDURE — 74176 CT ABD & PELVIS W/O CONTRAST: CPT

## 2022-05-18 RX ORDER — SODIUM CHLORIDE 0.9 % (FLUSH) 0.9 %
5-40 SYRINGE (ML) INJECTION PRN
Status: DISCONTINUED | OUTPATIENT
Start: 2022-05-18 | End: 2022-05-23 | Stop reason: HOSPADM

## 2022-05-18 RX ORDER — MAGNESIUM SULFATE IN WATER 40 MG/ML
2000 INJECTION, SOLUTION INTRAVENOUS PRN
Status: DISCONTINUED | OUTPATIENT
Start: 2022-05-18 | End: 2022-05-23 | Stop reason: HOSPADM

## 2022-05-18 RX ORDER — MORPHINE SULFATE 2 MG/ML
2 INJECTION, SOLUTION INTRAMUSCULAR; INTRAVENOUS
Status: DISCONTINUED | OUTPATIENT
Start: 2022-05-18 | End: 2022-05-20

## 2022-05-18 RX ORDER — NICOTINE 21 MG/24HR
1 PATCH, TRANSDERMAL 24 HOURS TRANSDERMAL DAILY
Status: DISCONTINUED | OUTPATIENT
Start: 2022-05-18 | End: 2022-05-18

## 2022-05-18 RX ORDER — ONDANSETRON 2 MG/ML
4 INJECTION INTRAMUSCULAR; INTRAVENOUS EVERY 6 HOURS PRN
Status: DISCONTINUED | OUTPATIENT
Start: 2022-05-18 | End: 2022-05-23 | Stop reason: HOSPADM

## 2022-05-18 RX ORDER — MORPHINE SULFATE 4 MG/ML
4 INJECTION, SOLUTION INTRAMUSCULAR; INTRAVENOUS
Status: DISCONTINUED | OUTPATIENT
Start: 2022-05-18 | End: 2022-05-20

## 2022-05-18 RX ORDER — NICOTINE 21 MG/24HR
1 PATCH, TRANSDERMAL 24 HOURS TRANSDERMAL ONCE
Status: COMPLETED | OUTPATIENT
Start: 2022-05-18 | End: 2022-05-19

## 2022-05-18 RX ORDER — SODIUM CHLORIDE 9 MG/ML
INJECTION, SOLUTION INTRAVENOUS PRN
Status: DISCONTINUED | OUTPATIENT
Start: 2022-05-18 | End: 2022-05-23 | Stop reason: HOSPADM

## 2022-05-18 RX ORDER — SODIUM CHLORIDE, SODIUM LACTATE, POTASSIUM CHLORIDE, CALCIUM CHLORIDE 600; 310; 30; 20 MG/100ML; MG/100ML; MG/100ML; MG/100ML
INJECTION, SOLUTION INTRAVENOUS CONTINUOUS
Status: DISCONTINUED | OUTPATIENT
Start: 2022-05-18 | End: 2022-05-23 | Stop reason: HOSPADM

## 2022-05-18 RX ORDER — LIDOCAINE HYDROCHLORIDE 20 MG/ML
JELLY TOPICAL ONCE
Status: COMPLETED | OUTPATIENT
Start: 2022-05-18 | End: 2022-05-18

## 2022-05-18 RX ORDER — ONDANSETRON 4 MG/1
4 TABLET, ORALLY DISINTEGRATING ORAL EVERY 8 HOURS PRN
Status: DISCONTINUED | OUTPATIENT
Start: 2022-05-18 | End: 2022-05-23 | Stop reason: HOSPADM

## 2022-05-18 RX ORDER — SODIUM CHLORIDE 9 MG/ML
INJECTION, SOLUTION INTRAVENOUS CONTINUOUS
Status: DISCONTINUED | OUTPATIENT
Start: 2022-05-18 | End: 2022-05-18

## 2022-05-18 RX ORDER — PANTOPRAZOLE SODIUM 40 MG/10ML
40 INJECTION, POWDER, LYOPHILIZED, FOR SOLUTION INTRAVENOUS DAILY
Status: DISCONTINUED | OUTPATIENT
Start: 2022-05-18 | End: 2022-05-23 | Stop reason: HOSPADM

## 2022-05-18 RX ORDER — POTASSIUM CHLORIDE 7.45 MG/ML
10 INJECTION INTRAVENOUS PRN
Status: DISCONTINUED | OUTPATIENT
Start: 2022-05-18 | End: 2022-05-23 | Stop reason: HOSPADM

## 2022-05-18 RX ORDER — SODIUM CHLORIDE 0.9 % (FLUSH) 0.9 %
5-40 SYRINGE (ML) INJECTION EVERY 12 HOURS SCHEDULED
Status: DISCONTINUED | OUTPATIENT
Start: 2022-05-18 | End: 2022-05-23 | Stop reason: HOSPADM

## 2022-05-18 RX ORDER — MORPHINE SULFATE 4 MG/ML
4 INJECTION, SOLUTION INTRAMUSCULAR; INTRAVENOUS
Status: DISCONTINUED | OUTPATIENT
Start: 2022-05-18 | End: 2022-05-18 | Stop reason: SDUPTHER

## 2022-05-18 RX ORDER — ENOXAPARIN SODIUM 100 MG/ML
30 INJECTION SUBCUTANEOUS DAILY
Status: DISCONTINUED | OUTPATIENT
Start: 2022-05-18 | End: 2022-05-23 | Stop reason: HOSPADM

## 2022-05-18 RX ORDER — ACETAMINOPHEN 325 MG/1
650 TABLET ORAL EVERY 4 HOURS PRN
Status: DISCONTINUED | OUTPATIENT
Start: 2022-05-18 | End: 2022-05-23 | Stop reason: HOSPADM

## 2022-05-18 RX ADMIN — ONDANSETRON 4 MG: 2 INJECTION INTRAMUSCULAR; INTRAVENOUS at 02:00

## 2022-05-18 RX ADMIN — MORPHINE SULFATE 4 MG: 4 INJECTION, SOLUTION INTRAMUSCULAR; INTRAVENOUS at 05:04

## 2022-05-18 RX ADMIN — SODIUM CHLORIDE, POTASSIUM CHLORIDE, SODIUM LACTATE AND CALCIUM CHLORIDE: 600; 310; 30; 20 INJECTION, SOLUTION INTRAVENOUS at 14:51

## 2022-05-18 RX ADMIN — MORPHINE SULFATE 4 MG: 4 INJECTION, SOLUTION INTRAMUSCULAR; INTRAVENOUS at 18:04

## 2022-05-18 RX ADMIN — ONDANSETRON 4 MG: 2 INJECTION INTRAMUSCULAR; INTRAVENOUS at 18:07

## 2022-05-18 RX ADMIN — ENOXAPARIN SODIUM 30 MG: 100 INJECTION SUBCUTANEOUS at 09:39

## 2022-05-18 RX ADMIN — PANTOPRAZOLE SODIUM 40 MG: 40 INJECTION, POWDER, FOR SOLUTION INTRAVENOUS at 09:37

## 2022-05-18 RX ADMIN — MORPHINE SULFATE 4 MG: 4 INJECTION, SOLUTION INTRAMUSCULAR; INTRAVENOUS at 13:16

## 2022-05-18 RX ADMIN — MORPHINE SULFATE 4 MG: 4 INJECTION, SOLUTION INTRAMUSCULAR; INTRAVENOUS at 09:37

## 2022-05-18 RX ADMIN — Medication 10 ML: at 09:38

## 2022-05-18 RX ADMIN — MORPHINE SULFATE 4 MG: 4 INJECTION, SOLUTION INTRAMUSCULAR; INTRAVENOUS at 20:08

## 2022-05-18 RX ADMIN — LIDOCAINE HYDROCHLORIDE: 20 JELLY TOPICAL at 03:47

## 2022-05-18 RX ADMIN — SODIUM CHLORIDE, POTASSIUM CHLORIDE, SODIUM LACTATE AND CALCIUM CHLORIDE: 600; 310; 30; 20 INJECTION, SOLUTION INTRAVENOUS at 06:33

## 2022-05-18 RX ADMIN — MORPHINE SULFATE 4 MG: 4 INJECTION, SOLUTION INTRAMUSCULAR; INTRAVENOUS at 02:00

## 2022-05-18 RX ADMIN — SODIUM CHLORIDE: 9 INJECTION, SOLUTION INTRAVENOUS at 03:11

## 2022-05-18 ASSESSMENT — PAIN DESCRIPTION - ONSET
ONSET: ON-GOING

## 2022-05-18 ASSESSMENT — PAIN DESCRIPTION - ORIENTATION
ORIENTATION: MID

## 2022-05-18 ASSESSMENT — PAIN DESCRIPTION - LOCATION
LOCATION: ABDOMEN

## 2022-05-18 ASSESSMENT — PAIN DESCRIPTION - PAIN TYPE
TYPE: ACUTE PAIN

## 2022-05-18 ASSESSMENT — PAIN DESCRIPTION - DESCRIPTORS
DESCRIPTORS: ACHING;SHARP
DESCRIPTORS: ACHING;SHARP
DESCRIPTORS: ACHING

## 2022-05-18 ASSESSMENT — PAIN DESCRIPTION - FREQUENCY
FREQUENCY: CONTINUOUS

## 2022-05-18 ASSESSMENT — PAIN SCALES - GENERAL
PAINLEVEL_OUTOF10: 0
PAINLEVEL_OUTOF10: 6
PAINLEVEL_OUTOF10: 0
PAINLEVEL_OUTOF10: 2
PAINLEVEL_OUTOF10: 0
PAINLEVEL_OUTOF10: 5
PAINLEVEL_OUTOF10: 9
PAINLEVEL_OUTOF10: 10
PAINLEVEL_OUTOF10: 8
PAINLEVEL_OUTOF10: 10
PAINLEVEL_OUTOF10: 7

## 2022-05-18 ASSESSMENT — PAIN - FUNCTIONAL ASSESSMENT
PAIN_FUNCTIONAL_ASSESSMENT: ACTIVITIES ARE NOT PREVENTED
PAIN_FUNCTIONAL_ASSESSMENT: PREVENTS OR INTERFERES SOME ACTIVE ACTIVITIES AND ADLS
PAIN_FUNCTIONAL_ASSESSMENT: ACTIVITIES ARE NOT PREVENTED

## 2022-05-18 NOTE — PLAN OF CARE
Problem: Discharge Planning  Goal: Discharge to home or other facility with appropriate resources  Outcome: Progressing  Note: Pt plans to return home at discharge, no [facility] needs identified at this time. Problem: Pain  Goal: Verbalizes/displays adequate comfort level or baseline comfort level  Outcome: Progressing  Note: Pt c/o abdominal pain rated 10/10. PRN Morphine administered with good results. Will monitor and medicate as needed.

## 2022-05-18 NOTE — ED NOTES
Report was given to EMS and the admitting nurse given. Patient stable at time of transfer.      Kerri Pedroza RN  05/18/22 2575

## 2022-05-18 NOTE — PROGRESS NOTES
Pharmacy Medication History Note    List of current medications patient is taking is complete. Source of Information:   1. Conversation with patient at bedside   2. Medication list in Epic       Medication Notes:  1.  Patient states that she takes no medications at home    Denies other OTC/Herbal use    Allergies clarified:JESSICA Suarez [facility]  5/18/2022  11:52 AM

## 2022-05-18 NOTE — PROGRESS NOTES
Pt resting in bed throughout shift. She continues to c/o abdominal pain rated 10/10. NG in place with small amount of output. Pt tolerating ice chips well at this time and is asking for popsicles. She did c/o nausea toward end of shift. PRN Zofran administered. Will continue to monitor.

## 2022-05-18 NOTE — ED TRIAGE NOTES
Patient complaining of lightheadedness and rectal pain. Patient states that she feel a bump on the anal region.

## 2022-05-18 NOTE — ED PROVIDER NOTES
Emergency Physician Note        Note Open Time: 1:05 AM EDT    Chief Complaint  Dizziness and Rectal Pain       History of Present Illness  Ruth Estrada is a 28 y.o. female who presents to the ED for vomiting. Patient reports she has been vomiting continuously for the last 3 days. She feels weak and lightheaded now. She denies any fevers, chills or sweats. The emesis did have some blood-tinged to it but not black. Patient reports no bowel movement in the last 3 days. She has a painful lump at the anus. She denies any chest pain or shortness of breath or any urinary or vaginal symptoms. Abdominal surgical history includes 3 C-sections and no other surgeries. 10 systems reviewed, pertinent positives per HPI otherwise noted to be negative    I have reviewed the following from the nursing documentation:      Prior to Admission medications    Medication Sig Start Date End Date Taking? Authorizing Provider   ibuprofen (ADVIL;MOTRIN) 800 MG tablet Take 1 tablet by mouth every 8 hours as needed for Pain 21   [facility], PA-C       Allergies as of 2022    (No Known Allergies)       Past Medical History:   Diagnosis Date    Anxiety     Depression         Surgical History:   Past Surgical History:   Procedure Laterality Date     SECTION      TUBAL LIGATION          Family History:  No family history on file.     Social History     Socioeconomic History    Marital status: Single     Spouse name: Not on file    Number of children: Not on file    Years of education: Not on file    Highest education level: Not on file   Occupational History    Not on file   Tobacco Use    Smoking status: Passive Smoke Exposure - Never Smoker    Smokeless tobacco: Never Used   Substance and Sexual Activity    Alcohol use: No    Drug use: No    Sexual activity: Not on file   Other Topics Concern    Not on file   Social History Narrative    Not on file     Social Determinants of Health Financial Resource Strain:     Difficulty of Paying Living Expenses: Not on file   Food Insecurity:     Worried About Running Out of Food in the Last Year: Not on file    Yeimy of Food in the Last Year: Not on file   Transportation Needs:     Lack of Transportation (Medical): Not on file    Lack of Transportation (Non-Medical): Not on file   Physical Activity:     Days of Exercise per Week: Not on file    Minutes of Exercise per Session: Not on file   Stress:     Feeling of Stress : Not on file   Social Connections:     Frequency of Communication with Friends and Family: Not on file    Frequency of Social Gatherings with Friends and Family: Not on file    Attends Cheondoism Services: Not on file    Active Member of [address] [place] [address] or Organizations: Not on file    Attends Club or Organization Meetings: Not on file    Marital Status: Not on file   Intimate Partner Violence:     Fear of Current or Ex-Partner: Not on file    Emotionally Abused: Not on file    Physically Abused: Not on file    Sexually Abused: Not on file   Housing Stability:     Unable to Pay for Housing in the Last Year: Not on file    Number of [place] in the Last Year: Not on file    Unstable Housing in the Last Year: Not on file       Nursing notes reviewed. ED Triage Vitals [05/17/22 2243]   Enc Vitals Group      BP (!) 127/98      Pulse 92      Resp 16      Temp 98.3 °F (36.8 °C)      Temp Source Axillary      SpO2 97 %      Weight 109 lb 12.8 oz (49.8 kg)      Height 5' 3\" (1.6 m)      Head Circumference       Peak Flow       Pain Score       Pain Loc       Pain Edu? Excl. in [address]? GENERAL:  Awake, alert. Well developed, well nourished with no apparent distress. HENT:  Normocephalic, Atraumatic, moist mucous membranes. EYES:  Pupils equal round and reactive to light, Conjunctiva normal, extraocular movements normal.  NECK:  No meningeal signs, Supple. CHEST:  Regular rate and rhythm, chest wall non-tender. LUNGS:  Clear to auscultation bilaterally. ABDOMEN:  Soft, mild upper abdominal tenderness, no rebound, rigidity or guarding, non-distended, normal bowel sounds. No costovertebral angle tenderness to palpation. BACK:  No tenderness. EXTREMITIES:  Normal range of motion, no edema, no bony tenderness, no deformity, distal pulses present. SKIN: Warm, dry and intact. NEUROLOGIC: Normal mental status. Moving all extremities to command. RECTAL: Large left lateral thrombosed external hemorrhoid. No active bleeding. RADIOLOGY  X-RAYS:  I have reviewed radiologic plain film image(s). ALL OTHER NON-PLAIN FILM IMAGES SUCH AS CT, ULTRASOUND AND MRI HAVE BEEN READ BY THE RADIOLOGIST. CT ABDOMEN PELVIS WO CONTRAST Additional Contrast? None   Final Result   1. Findings compatible with at least partial small-bowel obstruction with   transition and findings of stasis in the mid abdomen. 2.  Focal area of inflammatory opacity in the medial right lung base   compatible with bronchiolitis.               LABS  Labs Reviewed   CBC WITH AUTO DIFFERENTIAL - Abnormal; Notable for the following components:       Result Value    WBC 13.1 (*)     Neutrophils Absolute 11.5 (*)     Lymphocytes Absolute 0.4 (*)     All other components within normal limits   COMPREHENSIVE METABOLIC PANEL W/ REFLEX TO MG FOR LOW K - Abnormal; Notable for the following components:    Chloride 92 (*)     Anion Gap 19 (*)     Glucose 112 (*)     BUN 24 (*)     Total Bilirubin 1.3 (*)     All other components within normal limits   LIPASE - Abnormal; Notable for the following components:    Lipase 9.0 (*)     All other components within normal limits   URINALYSIS WITH REFLEX TO CULTURE - Abnormal; Notable for the following components:    Glucose, Ur 100 (*)     Bilirubin Urine MODERATE (*)     Ketones, Urine TRACE (*)     Blood, Urine LARGE (*)     Protein,  (*)     Urobilinogen, Urine 4.0 (*)     Nitrite, Urine POSITIVE (*)     All other components within normal limits   URINE DRUG SCREEN - Abnormal; Notable for the following components:    Cocaine Metabolite Screen, Urine POSITIVE (*)     All other components within normal limits   HCG, SERUM, QUALITATIVE   MAGNESIUM   MICROSCOPIC URINALYSIS   LACTIC ACID       PROCEDURES  EXTERNAL HEMORRHOID EVACUATION  Patient's thrombosed external hemorrhoid was anesthetized using 4 cc of 1% lidocaine with epinephrine. Portion of the thrombosed hemorrhoid was incised and several clots were expressed. There was minimal bleeding and minimal pain. MEDICAL DECISION MAKING        Patient's pain continued after her initial evaluation and treatment and for this reason CAT scan was obtained which demonstrated small bowel obstruction. Because of her continued pain and nausea NG tube was placed and I consulted hospitalist for admission. They recommend that he speak with general surgery. I spoke with Dr. Verónica Hutchison who agreed to admit the patient to his service. In my opinion patient is stable for admission. The total Critical Care time is 40 minutes which excludes separately billable procedures. The critical care was concerning IV fluid boluses for treatment of dehydration and NG tube placement for small bowel obstruction. This time is exclusive of any time documented by any other providers. I spoke with Dr. Verónica Hutchison. We thoroughly discussed the history, physical exam, laboratory and imaging studies, as well as, emergency department course. Based upon that discussion, we've decided to admit Dayana Mills for further observation and evaluation of Orquidea Armstrong's SBO. As I have deemed necessary from their history, physical, and studies, I have considered and evaluated Dayana Mills for the following diagnoses:        FINAL IMPRESSION  1. Small bowel obstruction ([address]    2. Thrombosed external hemorrhoid    3.  Prerenal azotemia        Vitals:  Blood pressure 115/80, pulse 82, temperature 98.3 °F (36.8 °C), temperature source Axillary, resp. rate 15, height 5' 3\" (1.6 m), weight 109 lb 12.8 oz (49.8 kg), SpO2 96 %, not currently breastfeeding. Disposition  Pt is in stable condition upon Admit to med/surg floor. This chart was generated using the [address] [place], [place] [address]  dictation system. I created this record but it may contain dictation errors.           Jossy Miles MD  05/18/22 2234

## 2022-05-18 NOTE — PROGRESS NOTES
Pt resting in bed at beginning of shift. She c/o ongoing abdominal pain rated 10/10, but denied any current nausea. PRN Morphine administered. NG tube in place, no drainage noted in collection container. Pt asking for ice chips. She is A&O, UAL. Will continue to monitor and assess.

## 2022-05-19 ENCOUNTER — APPOINTMENT (OUTPATIENT)
Dept: GENERAL RADIOLOGY | Age: 32
DRG: 230 | End: 2022-05-19
Payer: COMMERCIAL

## 2022-05-19 ENCOUNTER — ANESTHESIA EVENT (OUTPATIENT)
Dept: OPERATING ROOM | Age: 32
DRG: 230 | End: 2022-05-19
Payer: COMMERCIAL

## 2022-05-19 LAB
ANION GAP SERPL CALCULATED.3IONS-SCNC: 13 MMOL/L (ref 3–16)
BACTERIA: ABNORMAL /HPF
BASOPHILS ABSOLUTE: 0 K/UL (ref 0–0.2)
BASOPHILS RELATIVE PERCENT: 0.6 %
BILIRUBIN URINE: NEGATIVE
BLOOD, URINE: ABNORMAL
BUN BLDV-MCNC: 12 MG/DL (ref 7–20)
CALCIUM SERPL-MCNC: 8.1 MG/DL (ref 8.3–10.6)
CHLORIDE BLD-SCNC: 97 MMOL/L (ref 99–110)
CLARITY: ABNORMAL
CO2: 26 MMOL/L (ref 21–32)
COLOR: YELLOW
CREAT SERPL-MCNC: 0.6 MG/DL (ref 0.6–1.1)
EOSINOPHILS ABSOLUTE: 0.1 K/UL (ref 0–0.6)
EOSINOPHILS RELATIVE PERCENT: 2.3 %
EPITHELIAL CELLS, UA: 13 /HPF (ref 0–5)
GFR AFRICAN AMERICAN: >60
GFR NON-AFRICAN AMERICAN: >60
GLUCOSE BLD-MCNC: 76 MG/DL (ref 70–99)
GLUCOSE URINE: NEGATIVE MG/DL
HCT VFR BLD CALC: 37.8 % (ref 36–48)
HEMOGLOBIN: 12.9 G/DL (ref 12–16)
HYALINE CASTS: 1 /LPF (ref 0–8)
KETONES, URINE: 80 MG/DL
LEUKOCYTE ESTERASE, URINE: ABNORMAL
LYMPHOCYTES ABSOLUTE: 0.7 K/UL (ref 1–5.1)
LYMPHOCYTES RELATIVE PERCENT: 13.8 %
MAGNESIUM: 2.1 MG/DL (ref 1.8–2.4)
MCH RBC QN AUTO: 30.9 PG (ref 26–34)
MCHC RBC AUTO-ENTMCNC: 34.2 G/DL (ref 31–36)
MCV RBC AUTO: 90.4 FL (ref 80–100)
MICROSCOPIC EXAMINATION: YES
MONOCYTES ABSOLUTE: 0.7 K/UL (ref 0–1.3)
MONOCYTES RELATIVE PERCENT: 13.5 %
NEUTROPHILS ABSOLUTE: 3.6 K/UL (ref 1.7–7.7)
NEUTROPHILS RELATIVE PERCENT: 69.8 %
NITRITE, URINE: NEGATIVE
PDW BLD-RTO: 12.5 % (ref 12.4–15.4)
PH UA: 7.5 (ref 5–8)
PHOSPHORUS: 2.4 MG/DL (ref 2.5–4.9)
PLATELET # BLD: 170 K/UL (ref 135–450)
PMV BLD AUTO: 9.6 FL (ref 5–10.5)
POTASSIUM REFLEX MAGNESIUM: 3.5 MMOL/L (ref 3.5–5.1)
PROTEIN UA: ABNORMAL MG/DL
RBC # BLD: 4.18 M/UL (ref 4–5.2)
RBC UA: 1 /HPF (ref 0–4)
SODIUM BLD-SCNC: 136 MMOL/L (ref 136–145)
SPECIFIC GRAVITY UA: 1.02 (ref 1–1.03)
URINE TYPE: ABNORMAL
UROBILINOGEN, URINE: 1 E.U./DL
WBC # BLD: 5.2 K/UL (ref 4–11)
WBC UA: 2 /HPF (ref 0–5)

## 2022-05-19 PROCEDURE — 36415 COLL VENOUS BLD VENIPUNCTURE: CPT

## 2022-05-19 PROCEDURE — 6360000002 HC RX W HCPCS: Performed by: SURGERY

## 2022-05-19 PROCEDURE — 74019 RADEX ABDOMEN 2 VIEWS: CPT

## 2022-05-19 PROCEDURE — APPSS15 APP SPLIT SHARED TIME 0-15 MINUTES: Performed by: NURSE PRACTITIONER

## 2022-05-19 PROCEDURE — 74250 X-RAY XM SM INT 1CNTRST STD: CPT

## 2022-05-19 PROCEDURE — 87086 URINE CULTURE/COLONY COUNT: CPT

## 2022-05-19 PROCEDURE — 99232 SBSQ HOSP IP/OBS MODERATE 35: CPT | Performed by: SURGERY

## 2022-05-19 PROCEDURE — 83735 ASSAY OF MAGNESIUM: CPT

## 2022-05-19 PROCEDURE — 80048 BASIC METABOLIC PNL TOTAL CA: CPT

## 2022-05-19 PROCEDURE — 85025 COMPLETE CBC W/AUTO DIFF WBC: CPT

## 2022-05-19 PROCEDURE — 1200000000 HC SEMI PRIVATE

## 2022-05-19 PROCEDURE — 2580000003 HC RX 258: Performed by: SURGERY

## 2022-05-19 PROCEDURE — 81001 URINALYSIS AUTO W/SCOPE: CPT

## 2022-05-19 PROCEDURE — 94760 N-INVAS EAR/PLS OXIMETRY 1: CPT

## 2022-05-19 PROCEDURE — C9113 INJ PANTOPRAZOLE SODIUM, VIA: HCPCS | Performed by: SURGERY

## 2022-05-19 PROCEDURE — APPNB60 APP NON BILLABLE TIME 46-60 MINS: Performed by: NURSE PRACTITIONER

## 2022-05-19 PROCEDURE — 84100 ASSAY OF PHOSPHORUS: CPT

## 2022-05-19 RX ADMIN — MORPHINE SULFATE 4 MG: 4 INJECTION, SOLUTION INTRAMUSCULAR; INTRAVENOUS at 20:42

## 2022-05-19 RX ADMIN — ENOXAPARIN SODIUM 30 MG: 100 INJECTION SUBCUTANEOUS at 09:31

## 2022-05-19 RX ADMIN — ONDANSETRON 4 MG: 2 INJECTION INTRAMUSCULAR; INTRAVENOUS at 15:07

## 2022-05-19 RX ADMIN — ONDANSETRON 4 MG: 2 INJECTION INTRAMUSCULAR; INTRAVENOUS at 01:01

## 2022-05-19 RX ADMIN — MORPHINE SULFATE 4 MG: 4 INJECTION, SOLUTION INTRAMUSCULAR; INTRAVENOUS at 15:04

## 2022-05-19 RX ADMIN — SODIUM CHLORIDE, POTASSIUM CHLORIDE, SODIUM LACTATE AND CALCIUM CHLORIDE: 600; 310; 30; 20 INJECTION, SOLUTION INTRAVENOUS at 13:20

## 2022-05-19 RX ADMIN — MORPHINE SULFATE 2 MG: 2 INJECTION, SOLUTION INTRAMUSCULAR; INTRAVENOUS at 09:30

## 2022-05-19 RX ADMIN — ONDANSETRON 4 MG: 2 INJECTION INTRAMUSCULAR; INTRAVENOUS at 09:30

## 2022-05-19 RX ADMIN — PANTOPRAZOLE SODIUM 40 MG: 40 INJECTION, POWDER, FOR SOLUTION INTRAVENOUS at 09:30

## 2022-05-19 RX ADMIN — Medication 10 ML: at 09:32

## 2022-05-19 RX ADMIN — MORPHINE SULFATE 4 MG: 4 INJECTION, SOLUTION INTRAMUSCULAR; INTRAVENOUS at 12:05

## 2022-05-19 RX ADMIN — MORPHINE SULFATE 4 MG: 4 INJECTION, SOLUTION INTRAMUSCULAR; INTRAVENOUS at 18:42

## 2022-05-19 RX ADMIN — MORPHINE SULFATE 4 MG: 4 INJECTION, SOLUTION INTRAMUSCULAR; INTRAVENOUS at 01:02

## 2022-05-19 RX ADMIN — MORPHINE SULFATE 4 MG: 4 INJECTION, SOLUTION INTRAMUSCULAR; INTRAVENOUS at 05:40

## 2022-05-19 ASSESSMENT — PAIN DESCRIPTION - ORIENTATION
ORIENTATION: MID

## 2022-05-19 ASSESSMENT — PAIN DESCRIPTION - ONSET
ONSET: ON-GOING

## 2022-05-19 ASSESSMENT — PAIN SCALES - GENERAL
PAINLEVEL_OUTOF10: 4
PAINLEVEL_OUTOF10: 9
PAINLEVEL_OUTOF10: 0
PAINLEVEL_OUTOF10: 9
PAINLEVEL_OUTOF10: 4
PAINLEVEL_OUTOF10: 10
PAINLEVEL_OUTOF10: 9
PAINLEVEL_OUTOF10: 8
PAINLEVEL_OUTOF10: 0
PAINLEVEL_OUTOF10: 8
PAINLEVEL_OUTOF10: 4
PAINLEVEL_OUTOF10: 8
PAINLEVEL_OUTOF10: 0
PAINLEVEL_OUTOF10: 8

## 2022-05-19 ASSESSMENT — PAIN DESCRIPTION - LOCATION
LOCATION: ABDOMEN

## 2022-05-19 ASSESSMENT — PAIN DESCRIPTION - FREQUENCY
FREQUENCY: CONTINUOUS

## 2022-05-19 ASSESSMENT — PAIN - FUNCTIONAL ASSESSMENT
PAIN_FUNCTIONAL_ASSESSMENT: PREVENTS OR INTERFERES SOME ACTIVE ACTIVITIES AND ADLS

## 2022-05-19 ASSESSMENT — PAIN DESCRIPTION - PAIN TYPE
TYPE: ACUTE PAIN

## 2022-05-19 ASSESSMENT — PAIN DESCRIPTION - DESCRIPTORS
DESCRIPTORS: SHARP
DESCRIPTORS: ACHING
DESCRIPTORS: SHARP
DESCRIPTORS: ACHING
DESCRIPTORS: SHARP
DESCRIPTORS: SHARP

## 2022-05-19 NOTE — PROGRESS NOTES
Received a call back from Dr. Deny Hendricks, patient ok to have popsicles. Cherry flavored popsicle provided to patient. Patient denies further needs at this time.

## 2022-05-19 NOTE — PROGRESS NOTES
[facility] General Surgery                                   Daily Progress Note                                                         Pt Name: Octavia Record Number: [id number]  Date of Birth 1990   Today's Date: 5/19/2022  CC: SBO    ASSESSMENT/PLAN  SBO secondary to post operative adhesions  -Xrays yesterday without improvement of bowel dilation  -repeat films today. If not much improvement will need to discuss OR tomorrow vs getting SBFT if there is radiographic improvement.   -continue NG  -await return of GI function  -hopeful to avoid surgery       SUBJECTIVE  Eliz Gustafson is resting in bed. NGT in place. Denies bloating. Feeling a little better but denies GI function. OBJECTIVE  VITALS:  height is 5' 3\" (1.6 m) and weight is 110 lb 3.7 oz (50 kg). Her oral temperature is 99.1 °F (37.3 °C). Her blood pressure is 113/75 and her pulse is 88. Her respiration is 16 and oxygen saturation is 94%. INTAKE/OUTPUT:      Intake/Output Summary (Last 24 hours) at 5/19/2022 0852  Last data filed at 5/19/2022 0542  Gross per 24 hour   Intake 1256.47 ml   Output 535 ml   Net 721.47 ml     GENERAL: alert, cooperative, no distress  LUNGS: clear to ausculation, without wheezes, rales or rhonci  HEART: normal rate and regular rhythm  ABDOMEN: Soft, non tender, non distended. EXTREMITY: no cyanosis, clubbing or edema    I/O last 3 completed shifts: In: 1256.5 [P.O.:255;  I.V.:1001.5]  Out: 535 [Emesis/NG output:535]      LABS  Recent Labs     05/17/22  2343 05/17/22  2343 05/17/22  2351 05/19/22  0522   WBC 13.1*   < >  --  5.2   HGB 15.3   < >  --  12.9   HCT 43.7   < >  --  37.8      < >  --  170      < >  --  136   K 3.5   < >  --  3.5   CL 92*   < >  --  97*   CO2 27   < >  --  26   BUN 24*   < >  --  12   CREATININE 0.7   < >  --  0.6   MG 2.40   < >  --  2.10   PHOS  --   --   --  2.4*   CALCIUM 9.7   < >  --  8.1*   AST 33 --   --   --    ALT 34  --   --   --    BILITOT 1.3*  --   --   --    NITRU  --   --  POSITIVE*  --    COLORU  --   --  Yellow  --     < > = values in this interval not displayed. EDUCATION  Patient educated about Disease Process, Medications, Smoking Cessation, Oxygenation, Incentive Spirometry and Deep Breath and Cough, Diabetes, Hyperlipidemia, Smoking Cessation, Nutrition, Exercise and Hypertension    Electronically signed by KESHIA Aleman CNP on 5/19/2022 at 8:52 AM      [facility] Vascular Surgery   [phone] Office  [phone] Fax    Agree with above note. The patient was personally seen and examined. Lynne Cook is doing OK. Feeling slightly better today. Tolerating NG. No flatus or BM. NAD, alert and oriented  Normal respiratory effort, no accessory muscle use  Abd soft, minimally distended, NT  Ext: no cyanosis or clubbing    WBC 5.2  Cr 0.6    A/P: 27 yo female with SBO    NG with only 535 ml out. No GI function yet. Continue NG decompression, NPO, IV hydration. Hopefully her bowel obstruction is improving. Repeat abdominal films. Depending on appearance, may get SBFT  Will add onto OR schedule for tomorrow if no improvement of GI function or imaging.     Eleonora Fontana MD

## 2022-05-19 NOTE — PROGRESS NOTES
Patient continues to have nausea, abdominal pain, prn medications given for both this shift, vitals stable. Abdomen is soft and tender. Family at the bedside. Patient continues with the SBFT. NG output this shift 350 of brownish black output. SBFT is completed hooked back up to suction.  Call light and personal items in reach, will monitor

## 2022-05-19 NOTE — PROGRESS NOTES
Patient asking for popsicles, MD note states NPO exceptions are few ice chips. Patient states that someone told her earlier that popsicles would be ok, she is requesting MD be paged at this time to determine if she can or not. General sx paged at this time, will wait for call back.

## 2022-05-19 NOTE — PLAN OF CARE
Problem: Discharge Planning  Goal: Discharge to home or other facility with appropriate resources  5/18/2022 2224 by Sharif Thompson RN  Outcome: Progressing  Note: Assessed patients knowledge of discharge. Will continue to work with patient on discharge planning and answer patient questions. Will consult case management and  as necessary. 5/18/2022 1159 by Justine Zavaleta RN  Outcome: Progressing  Note: Pt plans to return home at discharge, no Eleanor Vilchis needs identified at this time. Problem: Pain  Goal: Verbalizes/displays adequate comfort level or baseline comfort level  5/18/2022 2224 by Sharif Thompson RN  Outcome: Progressing  Note: Educated patient on pain management. Will assess patients pain level per unit protocol, and provide pain management measures as needed. 5/18/2022 1159 by Justine Zavaleta RN  Outcome: Progressing  Note: Pt c/o abdominal pain rated 10/10. PRN Morphine administered with good results. Will monitor and medicate as needed. Problem: Safety - Adult  Goal: Free from fall injury  Outcome: Progressing  Note: Patient educated on fall prevention. Call light is within reach, bed locked in lowest position, personal items within reach, and bed alarm is on. Will round on patient per unit guidelines. Problem: ABCDS Injury Assessment  Goal: Absence of physical injury  Outcome: Progressing  Note: Pt assessed for fall risk and fall precautions put into place. Bed in lowest position and wheels locked, call light within reach. Nonskid footwear in place. Patient educated on appropriate method of transfer and to call for assistance.

## 2022-05-19 NOTE — CARE COORDINATION
INITIAL CASE MANAGEMENT ASSESSMENT    Reviewed chart, met with patient to assess possible discharge needs. Explained Case Management role/services. Living Situation: Patient lives with significant other. ADLs: independent      DME: none    PT/OT Recs: na     Active Services: none     Transportation: active      Medications: takes on her own; [address] [place] [address]    PCP: Isidra Mckeon    PLAN/COMMENTS: Patient plans to return home. She is unsure of her discharge needs. She currently has an NG tube. SW/CM provided contact information for patient or family to call with any questions. SW/CM will follow and assist as needed.     Electronically signed by FELICIANO Gomez, PHIL, Case Management on 5/19/2022 at 4:00 PM  [facility] [phone]

## 2022-05-19 NOTE — PLAN OF CARE
Problem: Discharge Planning  Goal: Discharge to home or other facility with appropriate resources  Outcome: Progressing  Note:  have visited the patient to assess discharge needs. Problem: Pain  Goal: Verbalizes/displays adequate comfort level or baseline comfort level  Outcome: Progressing  Note: Patient continues to have pain in the mid abdomen reported as sharp. NG tube remains in place. Problem: Safety - Adult  Goal: Free from fall injury  Outcome: Progressing  Note: No falls noted this shift. Patient ambulates without difficulty. Family member at bedside, spent the night. Bed kept in low position. Safe environment maintained. Bedside table & call light in reach. Uses call light appropriately when needing assistance. Problem: ABCDS Injury Assessment  Goal: Absence of physical injury  Outcome: Progressing  Note: Patient has been assessed for fall risk, fall precautions are in place, environment has been cleared of obstacles and reassessed during rounding, bed is in lowest position with the wheels locked, call light is within reach.  ID band has been verified, appropriate transfer methods have been utilized and patient has been educated on safety

## 2022-05-20 ENCOUNTER — APPOINTMENT (OUTPATIENT)
Dept: GENERAL RADIOLOGY | Age: 32
DRG: 230 | End: 2022-05-20
Payer: COMMERCIAL

## 2022-05-20 ENCOUNTER — ANESTHESIA (OUTPATIENT)
Dept: OPERATING ROOM | Age: 32
DRG: 230 | End: 2022-05-20
Payer: COMMERCIAL

## 2022-05-20 LAB
ABO/RH: NORMAL
ANTIBODY SCREEN: NORMAL
GLUCOSE BLD-MCNC: 81 MG/DL (ref 70–99)
HCG(URINE) PREGNANCY TEST: NEGATIVE
PERFORMED ON: NORMAL
URINE CULTURE, ROUTINE: NORMAL

## 2022-05-20 PROCEDURE — 0DT80ZZ RESECTION OF SMALL INTESTINE, OPEN APPROACH: ICD-10-PCS | Performed by: SURGERY

## 2022-05-20 PROCEDURE — 3700000000 HC ANESTHESIA ATTENDED CARE: Performed by: SURGERY

## 2022-05-20 PROCEDURE — 86900 BLOOD TYPING SEROLOGIC ABO: CPT

## 2022-05-20 PROCEDURE — 86901 BLOOD TYPING SEROLOGIC RH(D): CPT

## 2022-05-20 PROCEDURE — 2720000010 HC SURG SUPPLY STERILE: Performed by: SURGERY

## 2022-05-20 PROCEDURE — 6360000002 HC RX W HCPCS: Performed by: SURGERY

## 2022-05-20 PROCEDURE — 2580000003 HC RX 258: Performed by: SURGERY

## 2022-05-20 PROCEDURE — 6360000002 HC RX W HCPCS

## 2022-05-20 PROCEDURE — 2709999900 HC NON-CHARGEABLE SUPPLY: Performed by: SURGERY

## 2022-05-20 PROCEDURE — 3600000004 HC SURGERY LEVEL 4 BASE: Performed by: SURGERY

## 2022-05-20 PROCEDURE — 6360000002 HC RX W HCPCS: Performed by: ANESTHESIOLOGY

## 2022-05-20 PROCEDURE — 3600000014 HC SURGERY LEVEL 4 ADDTL 15MIN: Performed by: SURGERY

## 2022-05-20 PROCEDURE — 2500000003 HC RX 250 WO HCPCS: Performed by: SURGERY

## 2022-05-20 PROCEDURE — 7100000001 HC PACU RECOVERY - ADDTL 15 MIN: Performed by: SURGERY

## 2022-05-20 PROCEDURE — 44120 REMOVAL OF SMALL INTESTINE: CPT | Performed by: SURGERY

## 2022-05-20 PROCEDURE — 3700000001 HC ADD 15 MINUTES (ANESTHESIA): Performed by: SURGERY

## 2022-05-20 PROCEDURE — 74019 RADEX ABDOMEN 2 VIEWS: CPT

## 2022-05-20 PROCEDURE — 7100000000 HC PACU RECOVERY - FIRST 15 MIN: Performed by: SURGERY

## 2022-05-20 PROCEDURE — 88307 TISSUE EXAM BY PATHOLOGIST: CPT

## 2022-05-20 PROCEDURE — 2500000003 HC RX 250 WO HCPCS

## 2022-05-20 PROCEDURE — 1200000000 HC SEMI PRIVATE

## 2022-05-20 PROCEDURE — 2580000003 HC RX 258

## 2022-05-20 PROCEDURE — 84703 CHORIONIC GONADOTROPIN ASSAY: CPT

## 2022-05-20 PROCEDURE — 36415 COLL VENOUS BLD VENIPUNCTURE: CPT

## 2022-05-20 PROCEDURE — 99232 SBSQ HOSP IP/OBS MODERATE 35: CPT | Performed by: SURGERY

## 2022-05-20 PROCEDURE — 6370000000 HC RX 637 (ALT 250 FOR IP): Performed by: SURGERY

## 2022-05-20 PROCEDURE — A4217 STERILE WATER/SALINE, 500 ML: HCPCS | Performed by: SURGERY

## 2022-05-20 PROCEDURE — 86850 RBC ANTIBODY SCREEN: CPT

## 2022-05-20 RX ORDER — FENTANYL CITRATE 50 UG/ML
INJECTION, SOLUTION INTRAMUSCULAR; INTRAVENOUS PRN
Status: DISCONTINUED | OUTPATIENT
Start: 2022-05-20 | End: 2022-05-20 | Stop reason: SDUPTHER

## 2022-05-20 RX ORDER — LIDOCAINE HYDROCHLORIDE 20 MG/ML
INJECTION, SOLUTION EPIDURAL; INFILTRATION; INTRACAUDAL; PERINEURAL PRN
Status: DISCONTINUED | OUTPATIENT
Start: 2022-05-20 | End: 2022-05-20 | Stop reason: SDUPTHER

## 2022-05-20 RX ORDER — SODIUM CHLORIDE 0.9 % (FLUSH) 0.9 %
5-40 SYRINGE (ML) INJECTION PRN
Status: DISCONTINUED | OUTPATIENT
Start: 2022-05-20 | End: 2022-05-20 | Stop reason: HOSPADM

## 2022-05-20 RX ORDER — SODIUM CHLORIDE 0.9 % (FLUSH) 0.9 %
5-40 SYRINGE (ML) INJECTION EVERY 12 HOURS SCHEDULED
Status: DISCONTINUED | OUTPATIENT
Start: 2022-05-20 | End: 2022-05-20 | Stop reason: HOSPADM

## 2022-05-20 RX ORDER — BUPIVACAINE HYDROCHLORIDE 5 MG/ML
INJECTION, SOLUTION EPIDURAL; INTRACAUDAL
Status: COMPLETED | OUTPATIENT
Start: 2022-05-20 | End: 2022-05-20

## 2022-05-20 RX ORDER — MORPHINE SULFATE 4 MG/ML
4 INJECTION, SOLUTION INTRAMUSCULAR; INTRAVENOUS
Status: DISCONTINUED | OUTPATIENT
Start: 2022-05-20 | End: 2022-05-21

## 2022-05-20 RX ORDER — ROCURONIUM BROMIDE 10 MG/ML
INJECTION, SOLUTION INTRAVENOUS PRN
Status: DISCONTINUED | OUTPATIENT
Start: 2022-05-20 | End: 2022-05-20 | Stop reason: SDUPTHER

## 2022-05-20 RX ORDER — MIDAZOLAM HYDROCHLORIDE 1 MG/ML
INJECTION INTRAMUSCULAR; INTRAVENOUS PRN
Status: DISCONTINUED | OUTPATIENT
Start: 2022-05-20 | End: 2022-05-20 | Stop reason: SDUPTHER

## 2022-05-20 RX ORDER — FENTANYL CITRATE 50 UG/ML
25 INJECTION, SOLUTION INTRAMUSCULAR; INTRAVENOUS EVERY 5 MIN PRN
Status: DISCONTINUED | OUTPATIENT
Start: 2022-05-20 | End: 2022-05-20 | Stop reason: HOSPADM

## 2022-05-20 RX ORDER — ACETAMINOPHEN 325 MG/1
650 TABLET ORAL EVERY 6 HOURS
Status: DISCONTINUED | OUTPATIENT
Start: 2022-05-20 | End: 2022-05-23 | Stop reason: HOSPADM

## 2022-05-20 RX ORDER — PROPOFOL 10 MG/ML
INJECTION, EMULSION INTRAVENOUS PRN
Status: DISCONTINUED | OUTPATIENT
Start: 2022-05-20 | End: 2022-05-20 | Stop reason: SDUPTHER

## 2022-05-20 RX ORDER — MAGNESIUM HYDROXIDE 1200 MG/15ML
LIQUID ORAL CONTINUOUS PRN
Status: DISCONTINUED | OUTPATIENT
Start: 2022-05-20 | End: 2022-05-20 | Stop reason: HOSPADM

## 2022-05-20 RX ORDER — OXYCODONE HYDROCHLORIDE 5 MG/1
5 TABLET ORAL
Status: DISCONTINUED | OUTPATIENT
Start: 2022-05-20 | End: 2022-05-20 | Stop reason: HOSPADM

## 2022-05-20 RX ORDER — KETOROLAC TROMETHAMINE 30 MG/ML
30 INJECTION, SOLUTION INTRAMUSCULAR; INTRAVENOUS EVERY 6 HOURS
Status: COMPLETED | OUTPATIENT
Start: 2022-05-20 | End: 2022-05-22

## 2022-05-20 RX ORDER — SODIUM CHLORIDE 9 MG/ML
INJECTION, SOLUTION INTRAVENOUS PRN
Status: DISCONTINUED | OUTPATIENT
Start: 2022-05-20 | End: 2022-05-20 | Stop reason: HOSPADM

## 2022-05-20 RX ORDER — ONDANSETRON 2 MG/ML
4 INJECTION INTRAMUSCULAR; INTRAVENOUS
Status: DISCONTINUED | OUTPATIENT
Start: 2022-05-20 | End: 2022-05-20 | Stop reason: HOSPADM

## 2022-05-20 RX ORDER — ONDANSETRON 2 MG/ML
INJECTION INTRAMUSCULAR; INTRAVENOUS PRN
Status: DISCONTINUED | OUTPATIENT
Start: 2022-05-20 | End: 2022-05-20 | Stop reason: SDUPTHER

## 2022-05-20 RX ORDER — DEXAMETHASONE SODIUM PHOSPHATE 4 MG/ML
INJECTION, SOLUTION INTRA-ARTICULAR; INTRALESIONAL; INTRAMUSCULAR; INTRAVENOUS; SOFT TISSUE PRN
Status: DISCONTINUED | OUTPATIENT
Start: 2022-05-20 | End: 2022-05-20 | Stop reason: SDUPTHER

## 2022-05-20 RX ORDER — MORPHINE SULFATE 2 MG/ML
2 INJECTION, SOLUTION INTRAMUSCULAR; INTRAVENOUS
Status: DISCONTINUED | OUTPATIENT
Start: 2022-05-20 | End: 2022-05-21

## 2022-05-20 RX ADMIN — FENTANYL CITRATE 25 MCG: 50 INJECTION INTRAMUSCULAR; INTRAVENOUS at 10:09

## 2022-05-20 RX ADMIN — SUGAMMADEX 200 MG: 100 INJECTION, SOLUTION INTRAVENOUS at 10:32

## 2022-05-20 RX ADMIN — PROPOFOL 130 MG: 10 INJECTION, EMULSION INTRAVENOUS at 08:57

## 2022-05-20 RX ADMIN — ACETAMINOPHEN 650 MG: 325 TABLET ORAL at 12:54

## 2022-05-20 RX ADMIN — FENTANYL CITRATE 50 MCG: 50 INJECTION INTRAMUSCULAR; INTRAVENOUS at 08:57

## 2022-05-20 RX ADMIN — MORPHINE SULFATE 4 MG: 4 INJECTION, SOLUTION INTRAMUSCULAR; INTRAVENOUS at 04:57

## 2022-05-20 RX ADMIN — CEFAZOLIN 2000 MG: 10 INJECTION, POWDER, FOR SOLUTION INTRAVENOUS at 09:05

## 2022-05-20 RX ADMIN — KETOROLAC TROMETHAMINE 30 MG: 30 INJECTION, SOLUTION INTRAMUSCULAR at 12:54

## 2022-05-20 RX ADMIN — FENTANYL CITRATE 25 MCG: 50 INJECTION INTRAMUSCULAR; INTRAVENOUS at 08:50

## 2022-05-20 RX ADMIN — ONDANSETRON 4 MG: 2 INJECTION INTRAMUSCULAR; INTRAVENOUS at 04:58

## 2022-05-20 RX ADMIN — SODIUM CHLORIDE, POTASSIUM CHLORIDE, SODIUM LACTATE AND CALCIUM CHLORIDE: 600; 310; 30; 20 INJECTION, SOLUTION INTRAVENOUS at 21:00

## 2022-05-20 RX ADMIN — MIDAZOLAM 2 MG: 1 INJECTION INTRAMUSCULAR; INTRAVENOUS at 08:50

## 2022-05-20 RX ADMIN — MORPHINE SULFATE 4 MG: 4 INJECTION, SOLUTION INTRAMUSCULAR; INTRAVENOUS at 00:35

## 2022-05-20 RX ADMIN — MORPHINE SULFATE 4 MG: 4 INJECTION, SOLUTION INTRAMUSCULAR; INTRAVENOUS at 21:33

## 2022-05-20 RX ADMIN — LIDOCAINE HYDROCHLORIDE 100 MG: 20 INJECTION, SOLUTION EPIDURAL; INFILTRATION; INTRACAUDAL; PERINEURAL at 08:57

## 2022-05-20 RX ADMIN — DEXAMETHASONE SODIUM PHOSPHATE 10 MG: 4 INJECTION, SOLUTION INTRAMUSCULAR; INTRAVENOUS at 08:57

## 2022-05-20 RX ADMIN — HYDROMORPHONE HYDROCHLORIDE 0.5 MG: 1 INJECTION, SOLUTION INTRAMUSCULAR; INTRAVENOUS; SUBCUTANEOUS at 12:05

## 2022-05-20 RX ADMIN — HYDROMORPHONE HYDROCHLORIDE 0.5 MG: 1 INJECTION, SOLUTION INTRAMUSCULAR; INTRAVENOUS; SUBCUTANEOUS at 11:04

## 2022-05-20 RX ADMIN — ROCURONIUM BROMIDE 50 MG: 10 INJECTION INTRAVENOUS at 08:58

## 2022-05-20 RX ADMIN — ACETAMINOPHEN 650 MG: 325 TABLET ORAL at 18:49

## 2022-05-20 RX ADMIN — METHOCARBAMOL 1000 G: 100 INJECTION INTRAMUSCULAR; INTRAVENOUS at 10:27

## 2022-05-20 RX ADMIN — KETOROLAC TROMETHAMINE 30 MG: 30 INJECTION, SOLUTION INTRAMUSCULAR at 18:49

## 2022-05-20 RX ADMIN — MORPHINE SULFATE 4 MG: 4 INJECTION, SOLUTION INTRAMUSCULAR; INTRAVENOUS at 06:54

## 2022-05-20 RX ADMIN — HYDROMORPHONE HYDROCHLORIDE 0.5 MG: 1 INJECTION, SOLUTION INTRAMUSCULAR; INTRAVENOUS; SUBCUTANEOUS at 11:50

## 2022-05-20 RX ADMIN — HYDROMORPHONE HYDROCHLORIDE 0.5 MG: 1 INJECTION, SOLUTION INTRAMUSCULAR; INTRAVENOUS; SUBCUTANEOUS at 11:28

## 2022-05-20 RX ADMIN — ONDANSETRON 4 MG: 2 INJECTION INTRAMUSCULAR; INTRAVENOUS at 10:34

## 2022-05-20 ASSESSMENT — PAIN DESCRIPTION - ORIENTATION
ORIENTATION: MID

## 2022-05-20 ASSESSMENT — PAIN DESCRIPTION - PAIN TYPE
TYPE: SURGICAL PAIN
TYPE: SURGICAL PAIN
TYPE: ACUTE PAIN
TYPE: SURGICAL PAIN
TYPE: ACUTE PAIN
TYPE: ACUTE PAIN
TYPE: SURGICAL PAIN
TYPE: SURGICAL PAIN

## 2022-05-20 ASSESSMENT — PAIN DESCRIPTION - ONSET
ONSET: ON-GOING

## 2022-05-20 ASSESSMENT — PAIN DESCRIPTION - DESCRIPTORS
DESCRIPTORS: SHARP;ACHING
DESCRIPTORS: ACHING;SORE;CRAMPING
DESCRIPTORS: SORE;SHARP
DESCRIPTORS: ACHING;SORE;CRAMPING
DESCRIPTORS: CRAMPING
DESCRIPTORS: BURNING;CRAMPING;SORE
DESCRIPTORS: SHARP
DESCRIPTORS: SHARP
DESCRIPTORS: CRAMPING

## 2022-05-20 ASSESSMENT — PAIN SCALES - GENERAL
PAINLEVEL_OUTOF10: 5
PAINLEVEL_OUTOF10: 10
PAINLEVEL_OUTOF10: 6
PAINLEVEL_OUTOF10: 9
PAINLEVEL_OUTOF10: 8
PAINLEVEL_OUTOF10: 10
PAINLEVEL_OUTOF10: 10
PAINLEVEL_OUTOF10: 7
PAINLEVEL_OUTOF10: 0
PAINLEVEL_OUTOF10: 0
PAINLEVEL_OUTOF10: 7
PAINLEVEL_OUTOF10: 8
PAINLEVEL_OUTOF10: 10

## 2022-05-20 ASSESSMENT — PAIN - FUNCTIONAL ASSESSMENT
PAIN_FUNCTIONAL_ASSESSMENT: 0-10
PAIN_FUNCTIONAL_ASSESSMENT: PREVENTS OR INTERFERES SOME ACTIVE ACTIVITIES AND ADLS

## 2022-05-20 ASSESSMENT — PAIN DESCRIPTION - FREQUENCY
FREQUENCY: CONTINUOUS

## 2022-05-20 ASSESSMENT — PAIN DESCRIPTION - LOCATION
LOCATION: ABDOMEN

## 2022-05-20 NOTE — PROGRESS NOTES
Patient returned to room 21  from PACU  Patient is alert and oriented X4. Fluids: infusing. Vitals: stable. Dressings to the abdomen and midline are clean, dry, intact with surgical glue. She is disappointed not to have a clear diet but has been nice about it. NG remains in place and continues to put out stomach bile.  Call light and personal items in reach, will monitor

## 2022-05-20 NOTE — PROGRESS NOTES
Pt arrived to pacu from OR asleep. VSS on monitor. Pt 93% on RA. Ab soft x4 incisions on ab together with surgical glue cdi. Pt remains asleep.

## 2022-05-20 NOTE — PROGRESS NOTES
[facility] General Surgery                                   Daily Progress Note                                                         Pt Name: Octavia Record Number: [id number]  Date of Birth 1990   Today's Date: 5/20/2022  CC: SBO    ASSESSMENT/PLAN  SBO secondary to post operative adhesions  -SBFT personally reviewed, showing no progression of contrast to colon  -Abdominal films this morning show no progression of contrast to the colon  -Will proceed to the OR for laparoscopic lysis of adhesions, possible open, possible bowel resection. The risks, benefits, and alternatives were discussed with the patient and she is willing to consent for the operation. Ancef ordered. Bilateral SCDs. -continue NG  -IV hydration      SUBJECTIVE  Katie Matute is resting in bed. NGT in place. Complains of some bloating since the contrast from small bowel follow through yesterday. She is having some diffuse pain. Denies any flatus or BM. OBJECTIVE  VITALS:  height is 5' 3\" (1.6 m) and weight is 116 lb 10 oz (52.9 kg). Her oral temperature is 98.3 °F (36.8 °C). Her blood pressure is 127/94 (abnormal) and her pulse is 85. Her respiration is 18 and oxygen saturation is 97%. INTAKE/OUTPUT:      Intake/Output Summary (Last 24 hours) at 5/20/2022 0825  Last data filed at 5/20/2022 0456  Gross per 24 hour   Intake 40 ml   Output 1275 ml   Net -1235 ml     GENERAL: alert, cooperative, no distress  LUNGS: clear to ausculation, without wheezes, rales or rhonci  HEART: normal rate and regular rhythm  ABDOMEN: Soft, minimally tender diffusely, minimally distended. EXTREMITY: no cyanosis, clubbing or edema    I/O last 3 completed shifts:   In: 175 [P.O.:165; I.V.:10]  Out: 1675 [Urine:300; Emesis/NG output:1375]      LABS  Recent Labs     05/17/22  2343 05/17/22  2351 05/19/22  0522 05/19/22  1632   WBC 13.1*   < > 5.2  --    HGB 15.3   < > 12.9  --    HCT

## 2022-05-20 NOTE — ANESTHESIA PRE PROCEDURE
[facility] Department of Anesthesiology  Pre-Anesthesia Evaluation/Consultation       Name:  Chuy Vidal  : 1990  Age:  28 y.o. MRN:  [id number]  Date: 2022           Surgeon: Surgeon(s):  Katherine Yoo MD    Procedure: Procedure(s):  LAPAROSCOPIC LYSIS OF ADHESIONS, POSSIBLE OPEN, POSSIBLE BOWEL RESECTION     No Known Allergies  Patient Active Problem List   Diagnosis    Contusion, chest wall, unspecified laterality, initial encounter    Small bowel obstruction ([address]     Past Medical History:   Diagnosis Date    Anxiety     Depression      Past Surgical History:   Procedure Laterality Date     SECTION      TUBAL LIGATION       Social History     Tobacco Use    Smoking status: Current Every Day Smoker    Smokeless tobacco: Never Used   Substance Use Topics    Alcohol use: No    Drug use: No     Medications  No current facility-administered medications on file prior to encounter. No current outpatient medications on file prior to encounter.      Current Facility-Administered Medications   Medication Dose Route Frequency Provider Last Rate Last Admin    sodium chloride flush 0.9 % injection 5-40 mL  5-40 mL IntraVENous 2 times per day Gabriela Ochoa MD        sodium chloride flush 0.9 % injection 5-40 mL  5-40 mL IntraVENous PRN Gabriela Ochoa MD        0.9 % sodium chloride infusion   IntraVENous PRN Gabriela Ochoa MD        ceFAZolin (ANCEF) 2000 mg in dextrose 5 % 100 mL IVPB  2,000 mg IntraVENous On Call to [address] MD        sodium chloride flush 0.9 % injection 5-40 mL  5-40 mL IntraVENous 2 times per day Katherine Yoo MD   10 mL at 22 0932    sodium chloride flush 0.9 % injection 5-40 mL  5-40 mL IntraVENous PRN Katherine Yoo MD        0.9 % sodium chloride infusion   IntraVENous PRN Katherine Yoo MD        ondansetron (ZOFRAN-ODT) disintegrating tablet 4 mg  4 Encounters:   05/19/22 (!) 127/94   08/24/21 110/60   10/22/20 109/77     BMI  Body mass index is 20.66 kg/m². Estimated body mass index is 20.66 kg/m² as calculated from the following:    Height as of this encounter: 5' 3\" (1.6 m). Weight as of this encounter: 116 lb 10 oz (52.9 kg).     CBC   Lab Results   Component Value Date    WBC 5.2 05/19/2022    RBC 4.18 05/19/2022    HGB 12.9 05/19/2022    HCT 37.8 05/19/2022    MCV 90.4 05/19/2022    RDW 12.5 05/19/2022     05/19/2022     CMP    Lab Results   Component Value Date     05/19/2022    K 3.5 05/19/2022    CL 97 05/19/2022    CO2 26 05/19/2022    BUN 12 05/19/2022    CREATININE 0.6 05/19/2022    GFRAA >60 05/19/2022    GFRAA >60 09/15/2012    AGRATIO 2.0 05/17/2022    LABGLOM >60 05/19/2022    GLUCOSE 76 05/19/2022    PROT 7.5 05/17/2022    PROT 6.9 09/15/2012    CALCIUM 8.1 05/19/2022    BILITOT 1.3 05/17/2022    ALKPHOS 56 05/17/2022    AST 33 05/17/2022    ALT 34 05/17/2022     BMP    Lab Results   Component Value Date     05/19/2022    K 3.5 05/19/2022    CL 97 05/19/2022    CO2 26 05/19/2022    BUN 12 05/19/2022    CREATININE 0.6 05/19/2022    CALCIUM 8.1 05/19/2022    GFRAA >60 05/19/2022    GFRAA >60 09/15/2012    LABGLOM >60 05/19/2022    GLUCOSE 76 05/19/2022     POCGlucose  Recent Labs     05/17/22  2343 05/19/22 0522   GLUCOSE 112* 76      Coags  No results found for: PROTIME, INR, APTT  HCG (If Applicable)   Lab Results   Component Value Date    PREGTESTUR Negative 05/20/2022      ABGs No results found for: PHART, PO2ART, NOZ3XBE, FYC6CSJ, BEART, S6XRIWLC   Type & Screen (If Applicable)  No results found for: LABABO, LABRH                         BMI: Wt Readings from Last 3 Encounters:       NPO Status:   Date of last liquid consumption: 05/18/22   Time of last liquid consumption: 0015   Date of last solid food consumption: 05/18/22      Time of last solid consumption: 0015       Anesthesia Evaluation  Patient summary reviewed no history of anesthetic complications:   Airway: Mallampati: III  TM distance: >3 FB   Neck ROM: full   Dental: normal exam         Pulmonary:Negative Pulmonary ROS and normal exam                               Cardiovascular:Negative CV ROS  Exercise tolerance: good (>4 METS),           Rhythm: regular  Rate: normal           Beta Blocker:  Not on Beta Blocker         Neuro/Psych:   (+) psychiatric history:depression/anxiety             GI/Hepatic/Renal: Neg GI/Hepatic/Renal ROS            Endo/Other:        (-) blood dyscrasia                ROS comment: Hx cocaine use; last MAY 17 Abdominal:             Vascular: negative vascular ROS. Other Findings:             Anesthesia Plan      general     ASA 3       Induction: intravenous. MIPS: Postoperative opioids intended and Prophylactic antiemetics administered. Anesthetic plan and risks discussed with patient. Plan discussed with CRNA. This pre-anesthesia assessment may be used as a history and physical.    DOS STAFF ADDENDUM:    Pt seen and examined, chart reviewed (including anesthesia, drug and allergy history). No interval changes to history and physical examination. Anesthetic plan, risks, benefits, alternatives, and personnel involved discussed with patient. Questions and concerns addressed. Patient(family) verbalized an understanding and agrees to proceed.       Nino Henderson MD  May 20, 2022  8:29 AM

## 2022-05-20 NOTE — PROGRESS NOTES
Pt unable to void bedpan removed at pt's request. Pt c/o pain see mar. Pt falls easily back to sleep.

## 2022-05-20 NOTE — BRIEF OP NOTE
Brief Postoperative Note      Patient: Kermit Nieves  YOB: 1990  MRN: [id number]    Date of Procedure: 5/20/2022    Pre-Op Diagnosis: SMALL BOWEL OBSTRUCTION    Post-Op Diagnosis: Same       Procedure(s):  LAPAROSCOPIC LYSIS OF ADHESIONS OPEN BOWEL RESECTION    Surgeon(s):  Harry Caldwell MD    Assistant:  Surgical Assistant: Liudmila Pope    Anesthesia: General    Estimated Blood Loss (mL): less than 50     Complications: None    Specimens:   ID Type Source Tests Collected by Time Destination   A : A) Small bowel Specimen Abdomen SURGICAL PATHOLOGY Harry Caldwell MD 5/20/2022 5640        Implants:  * No implants in log *      Drains:   NG/OG/NJ/NE Tube Nasogastric Right nostril (Active)   Surrounding Skin Clean, dry & intact 05/19/22 2042   Securement device Tape 05/19/22 2042   Status Suction-low intermittent 05/19/22 2042   Placement Verified Gastric Contents 05/19/22 2042   NG/OG/NJ/NE External Measurement (cm) 55 cm 05/19/22 2042   Drainage Appearance None 05/19/22 2042   Output (mL) 425 ml 05/20/22 0456       Findings: small bowel incarcerated within a pelvic defect in peritoneum, with inlet and outlet bands of ischemia on the bowel loop    Electronically signed by Harry Caldwell MD on 5/20/2022 at 10:32 AM

## 2022-05-20 NOTE — ANESTHESIA POSTPROCEDURE EVALUATION
Department of Anesthesiology  Postprocedure Note    Patient: Broderick Pedro  MRN: [id number]  YOB: 1990  Date of evaluation: 5/20/2022  Time:  12:31 PM     Procedure Summary     Date: 05/20/22 Room / Location: [address] [place] [address] / [facility]    Anesthesia Start: 2433 Anesthesia Stop: 8937    Procedure: LAPAROSCOPIC LYSIS OF ADHESIONS WITH SMALL BOWEL RESECTION (N/A Abdomen) Diagnosis:       Intestinal obstruction, unspecified cause, unspecified whether partial or complete ([address]      (BOWEL OBSTRUCTION)    Surgeons: Elizabeth Mason MD Responsible Provider: Gemma Jeong MD    Anesthesia Type: general ASA Status: 3          Anesthesia Type: No value filed. Susy Phase I: Susy Score: 8    Susy Phase II:      Last vitals: Reviewed and per EMR flowsheets.        Anesthesia Post Evaluation    Patient location during evaluation: PACU  Patient participation: complete - patient participated  Level of consciousness: awake and alert  Pain score: 4  Nausea & Vomiting: no nausea  Complications: no  Cardiovascular status: hemodynamically stable  Respiratory status: acceptable  Hydration status: stable

## 2022-05-20 NOTE — PLAN OF CARE
Problem: Discharge Planning  Goal: Discharge to home or other facility with appropriate resources  5/20/2022 0029 by Destiny Munson RN  Outcome: Progressing  Note: Assessed patients knowledge of discharge. Will continue to work with patient on discharge planning and answer patient questions. Will consult case management and  as necessary. 5/19/2022 1813 by Eddie Gambino RN  Outcome: Progressing  Note:  have visited the patient to assess discharge needs. Problem: Pain  Goal: Verbalizes/displays adequate comfort level or baseline comfort level  5/20/2022 0029 by Destiny Munson RN  Outcome: Progressing  Note: Educated patient on pain management. Will assess patients pain level per unit protocol, and provide pain management measures as needed. 5/19/2022 1813 by Eddie Gambino RN  Outcome: Progressing  Note: Patient continues to have pain in the mid abdomen reported as sharp. NG tube remains in place. Problem: Safety - Adult  Goal: Free from fall injury  5/20/2022 0029 by Destiny Munson RN  Outcome: Progressing  Note: Patient educated on fall prevention. Call light is within reach, bed locked in lowest position, personal items within reach, and bed alarm is on. Will round on patient per unit guidelines. 5/19/2022 1813 by Eddie Gambino RN  Outcome: Progressing  Note: No falls noted this shift. Patient ambulates without difficulty. Family member at bedside, spent the night. Bed kept in low position. Safe environment maintained. Bedside table & call light in reach. Uses call light appropriately when needing assistance. Problem: ABCDS Injury Assessment  Goal: Absence of physical injury  5/20/2022 0029 by Destiny Munson RN  Outcome: Progressing  Note: Pt assessed for fall risk and fall precautions put into place. Bed in lowest position and wheels locked, call light within reach. Nonskid footwear in place.  Patient educated on appropriate method of transfer and to call for assistance. 5/19/2022 1813 by Lilian Grider RN  Outcome: Progressing  Note: Patient has been assessed for fall risk, fall precautions are in place, environment has been cleared of obstacles and reassessed during rounding, bed is in lowest position with the wheels locked, call light is within reach.  ID band has been verified, appropriate transfer methods have been utilized and patient has been educated on safety

## 2022-05-20 NOTE — PROGRESS NOTES
Pre-op called this RN to discuss surgical time for patient. Surgery is planned for 9 AM with a  time of 8. No pregnancy urine collected at this time. Orders placed for pregnancy urine. Hat placed in commode and informed patient for need of urine sample. Patient verbalized understanding and agreed to call this RN when sample was ready.

## 2022-05-21 LAB
ANION GAP SERPL CALCULATED.3IONS-SCNC: 12 MMOL/L (ref 3–16)
BASOPHILS ABSOLUTE: 0 K/UL (ref 0–0.2)
BASOPHILS RELATIVE PERCENT: 0.2 %
BUN BLDV-MCNC: 9 MG/DL (ref 7–20)
CALCIUM SERPL-MCNC: 8.1 MG/DL (ref 8.3–10.6)
CHLORIDE BLD-SCNC: 96 MMOL/L (ref 99–110)
CO2: 25 MMOL/L (ref 21–32)
CREAT SERPL-MCNC: <0.5 MG/DL (ref 0.6–1.1)
EOSINOPHILS ABSOLUTE: 0 K/UL (ref 0–0.6)
EOSINOPHILS RELATIVE PERCENT: 0.8 %
GFR AFRICAN AMERICAN: >60
GFR NON-AFRICAN AMERICAN: >60
GLUCOSE BLD-MCNC: 112 MG/DL (ref 70–99)
HCT VFR BLD CALC: 37.3 % (ref 36–48)
HEMOGLOBIN: 12.6 G/DL (ref 12–16)
LYMPHOCYTES ABSOLUTE: 0.5 K/UL (ref 1–5.1)
LYMPHOCYTES RELATIVE PERCENT: 10.8 %
MAGNESIUM: 2 MG/DL (ref 1.8–2.4)
MCH RBC QN AUTO: 30.7 PG (ref 26–34)
MCHC RBC AUTO-ENTMCNC: 33.8 G/DL (ref 31–36)
MCV RBC AUTO: 90.8 FL (ref 80–100)
MONOCYTES ABSOLUTE: 0.5 K/UL (ref 0–1.3)
MONOCYTES RELATIVE PERCENT: 10.8 %
NEUTROPHILS ABSOLUTE: 3.5 K/UL (ref 1.7–7.7)
NEUTROPHILS RELATIVE PERCENT: 77.4 %
PDW BLD-RTO: 12.5 % (ref 12.4–15.4)
PHOSPHORUS: 2.2 MG/DL (ref 2.5–4.9)
PLATELET # BLD: 190 K/UL (ref 135–450)
PMV BLD AUTO: 9.6 FL (ref 5–10.5)
POTASSIUM SERPL-SCNC: 4.2 MMOL/L (ref 3.5–5.1)
RBC # BLD: 4.1 M/UL (ref 4–5.2)
SODIUM BLD-SCNC: 133 MMOL/L (ref 136–145)
WBC # BLD: 4.6 K/UL (ref 4–11)

## 2022-05-21 PROCEDURE — 85025 COMPLETE CBC W/AUTO DIFF WBC: CPT

## 2022-05-21 PROCEDURE — 83735 ASSAY OF MAGNESIUM: CPT

## 2022-05-21 PROCEDURE — 2580000003 HC RX 258: Performed by: SURGERY

## 2022-05-21 PROCEDURE — 99024 POSTOP FOLLOW-UP VISIT: CPT | Performed by: SURGERY

## 2022-05-21 PROCEDURE — C9113 INJ PANTOPRAZOLE SODIUM, VIA: HCPCS | Performed by: SURGERY

## 2022-05-21 PROCEDURE — 6360000002 HC RX W HCPCS: Performed by: SURGERY

## 2022-05-21 PROCEDURE — 94760 N-INVAS EAR/PLS OXIMETRY 1: CPT

## 2022-05-21 PROCEDURE — 2500000003 HC RX 250 WO HCPCS: Performed by: SURGERY

## 2022-05-21 PROCEDURE — 80048 BASIC METABOLIC PNL TOTAL CA: CPT

## 2022-05-21 PROCEDURE — 6370000000 HC RX 637 (ALT 250 FOR IP): Performed by: SURGERY

## 2022-05-21 PROCEDURE — 1200000000 HC SEMI PRIVATE

## 2022-05-21 PROCEDURE — 84100 ASSAY OF PHOSPHORUS: CPT

## 2022-05-21 PROCEDURE — 36415 COLL VENOUS BLD VENIPUNCTURE: CPT

## 2022-05-21 RX ORDER — OXYCODONE HYDROCHLORIDE 5 MG/1
5 TABLET ORAL EVERY 6 HOURS PRN
Status: DISCONTINUED | OUTPATIENT
Start: 2022-05-21 | End: 2022-05-23 | Stop reason: HOSPADM

## 2022-05-21 RX ADMIN — OXYCODONE 5 MG: 5 TABLET ORAL at 11:29

## 2022-05-21 RX ADMIN — ACETAMINOPHEN 650 MG: 325 TABLET ORAL at 06:23

## 2022-05-21 RX ADMIN — ENOXAPARIN SODIUM 30 MG: 100 INJECTION SUBCUTANEOUS at 08:24

## 2022-05-21 RX ADMIN — ACETAMINOPHEN 650 MG: 325 TABLET ORAL at 12:47

## 2022-05-21 RX ADMIN — OXYCODONE 5 MG: 5 TABLET ORAL at 17:41

## 2022-05-21 RX ADMIN — PANTOPRAZOLE SODIUM 40 MG: 40 INJECTION, POWDER, FOR SOLUTION INTRAVENOUS at 08:26

## 2022-05-21 RX ADMIN — HYDROMORPHONE HYDROCHLORIDE 0.5 MG: 1 INJECTION, SOLUTION INTRAMUSCULAR; INTRAVENOUS; SUBCUTANEOUS at 20:25

## 2022-05-21 RX ADMIN — SODIUM CHLORIDE, POTASSIUM CHLORIDE, SODIUM LACTATE AND CALCIUM CHLORIDE: 600; 310; 30; 20 INJECTION, SOLUTION INTRAVENOUS at 05:03

## 2022-05-21 RX ADMIN — HYDROMORPHONE HYDROCHLORIDE 0.5 MG: 1 INJECTION, SOLUTION INTRAMUSCULAR; INTRAVENOUS; SUBCUTANEOUS at 16:14

## 2022-05-21 RX ADMIN — HYDROMORPHONE HYDROCHLORIDE 0.5 MG: 1 INJECTION, SOLUTION INTRAMUSCULAR; INTRAVENOUS; SUBCUTANEOUS at 23:24

## 2022-05-21 RX ADMIN — MORPHINE SULFATE 4 MG: 4 INJECTION, SOLUTION INTRAMUSCULAR; INTRAVENOUS at 04:05

## 2022-05-21 RX ADMIN — POTASSIUM PHOSPHATE, MONOBASIC AND POTASSIUM PHOSPHATE, DIBASIC 15 MMOL: 224; 236 INJECTION, SOLUTION INTRAVENOUS at 11:35

## 2022-05-21 RX ADMIN — MORPHINE SULFATE 2 MG: 2 INJECTION, SOLUTION INTRAMUSCULAR; INTRAVENOUS at 08:25

## 2022-05-21 RX ADMIN — KETOROLAC TROMETHAMINE 30 MG: 30 INJECTION, SOLUTION INTRAMUSCULAR at 18:24

## 2022-05-21 RX ADMIN — ACETAMINOPHEN 650 MG: 325 TABLET ORAL at 00:55

## 2022-05-21 RX ADMIN — KETOROLAC TROMETHAMINE 30 MG: 30 INJECTION, SOLUTION INTRAMUSCULAR at 12:48

## 2022-05-21 RX ADMIN — ACETAMINOPHEN 650 MG: 325 TABLET ORAL at 18:23

## 2022-05-21 RX ADMIN — Medication 10 ML: at 08:26

## 2022-05-21 RX ADMIN — KETOROLAC TROMETHAMINE 30 MG: 30 INJECTION, SOLUTION INTRAMUSCULAR at 06:23

## 2022-05-21 RX ADMIN — KETOROLAC TROMETHAMINE 30 MG: 30 INJECTION, SOLUTION INTRAMUSCULAR at 00:54

## 2022-05-21 RX ADMIN — HYDROMORPHONE HYDROCHLORIDE 0.5 MG: 1 INJECTION, SOLUTION INTRAMUSCULAR; INTRAVENOUS; SUBCUTANEOUS at 12:48

## 2022-05-21 RX ADMIN — Medication 10 ML: at 08:27

## 2022-05-21 ASSESSMENT — PAIN DESCRIPTION - LOCATION
LOCATION: ABDOMEN

## 2022-05-21 ASSESSMENT — PAIN - FUNCTIONAL ASSESSMENT
PAIN_FUNCTIONAL_ASSESSMENT: ACTIVITIES ARE NOT PREVENTED
PAIN_FUNCTIONAL_ASSESSMENT: PREVENTS OR INTERFERES SOME ACTIVE ACTIVITIES AND ADLS
PAIN_FUNCTIONAL_ASSESSMENT: ACTIVITIES ARE NOT PREVENTED
PAIN_FUNCTIONAL_ASSESSMENT: ACTIVITIES ARE NOT PREVENTED

## 2022-05-21 ASSESSMENT — PAIN DESCRIPTION - ONSET
ONSET: ON-GOING

## 2022-05-21 ASSESSMENT — PAIN DESCRIPTION - FREQUENCY
FREQUENCY: CONTINUOUS

## 2022-05-21 ASSESSMENT — PAIN SCALES - GENERAL
PAINLEVEL_OUTOF10: 8
PAINLEVEL_OUTOF10: 8
PAINLEVEL_OUTOF10: 5
PAINLEVEL_OUTOF10: 7
PAINLEVEL_OUTOF10: 8
PAINLEVEL_OUTOF10: 7
PAINLEVEL_OUTOF10: 9
PAINLEVEL_OUTOF10: 8

## 2022-05-21 ASSESSMENT — PAIN DESCRIPTION - PAIN TYPE
TYPE: ACUTE PAIN;SURGICAL PAIN
TYPE: SURGICAL PAIN
TYPE: ACUTE PAIN;SURGICAL PAIN
TYPE: SURGICAL PAIN

## 2022-05-21 ASSESSMENT — PAIN DESCRIPTION - ORIENTATION
ORIENTATION: MID

## 2022-05-21 ASSESSMENT — PAIN DESCRIPTION - DESCRIPTORS
DESCRIPTORS: ACHING

## 2022-05-21 ASSESSMENT — ENCOUNTER SYMPTOMS: NAUSEA: 0

## 2022-05-21 NOTE — PLAN OF CARE
Problem: Discharge Planning  Goal: Discharge to home or other facility with appropriate resources  5/21/2022 0913 by Neptali Kothari RN  Outcome: Progressing  Note: Pt will be discharged to appropriate level of care. Plan is to return home. SW and medical team following.   5/21/2022 0400 by Shelby Muniz RN  Outcome: Progressing     Problem: Pain  Goal: Verbalizes/displays adequate comfort level or baseline comfort level  5/21/2022 0913 by Neptali Kothari RN  Outcome: Progressing  Note: Pt assessed for pain. Pt in pain and assessed with 0-10 pain rating scale. Pt given prescribed analgesic for pain. (See eMar) Pt satisfied with pain relief thus far. Will reassess and continue to monitor. 5/21/2022 0400 by Shelby Muniz RN  Outcome: Progressing     Problem: Safety - Adult  Goal: Free from fall injury  Outcome: Progressing  Note: Pt is free of injury. No injury noted. Fall precautions in place. Call light within reach. Will monitor. Problem: ABCDS Injury Assessment  Goal: Absence of physical injury  Outcome: Progressing  Note: Pt is free of injury. No injury noted. Fall precautions in place. Call light within reach. Will monitor.

## 2022-05-21 NOTE — PROGRESS NOTES
Progress Note  Date:2022       Room:W8D-5575/3123-01  Patient Magalys Chilel     YOB: 1990     Age:31 y.o. Subjective    Subjective:  Symptoms:  Stable. Diet:  NPO. No nausea. Activity level: Impaired due to pain. Pain:  She complains of pain that is moderate. Review of Systems   Gastrointestinal: Negative for nausea. Objective         Vitals Last 24 Hours:  TEMPERATURE:  Temp  Av.9 °F (37.2 °C)  Min: 98 °F (36.7 °C)  Max: 99.5 °F (37.5 °C)  RESPIRATIONS RANGE: Resp  Avg: 15.4  Min: 12  Max: 23  PULSE OXIMETRY RANGE: SpO2  Av.6 %  Min: 95 %  Max: 100 %  PULSE RANGE: Pulse  Av.1  Min: 78  Max: 102  BLOOD PRESSURE RANGE: Systolic (76SNT), WJQ:872 , Min:102 , ZKK:405   ; Diastolic (17WEH), QET:92, Min:67, Max:96    I/O (24Hr): Intake/Output Summary (Last 24 hours) at 2022 1107  Last data filed at 2022 0840  Gross per 24 hour   Intake 0 ml   Output 600 ml   Net -600 ml     Objective  Labs/Imaging/Diagnostics    Labs:  CBC:  Recent Labs     22  0522 22  0458   WBC 5.2 4.6   RBC 4.18 4.10   HGB 12.9 12.6   HCT 37.8 37.3   MCV 90.4 90.8   RDW 12.5 12.5    190     CHEMISTRIES:  Recent Labs     22  0522 22  0458    133*   K 3.5 4.2   CL 97* 96*   CO2 26 25   BUN 12 9   CREATININE 0.6 <0.5*   GLUCOSE 76 112*   PHOS 2.4* 2.2*   MG 2.10 2.00     PT/INR:No results for input(s): PROTIME, INR in the last 72 hours. APTT:No results for input(s): APTT in the last 72 hours. LIVER PROFILE:No results for input(s): AST, ALT, BILIDIR, BILITOT, ALKPHOS in the last 72 hours. Imaging Last 24 Hours:  FL SMALL BOWEL FOLLOW THROUGH ONLY    Result Date: 2022  EXAMINATION: SMALL BOWEL FOLLOW THROUGH SERIES 2022 TECHNIQUE: Small bowel follow through series was performed with overhead images and spot images.  FLUOROSCOPY DOSE AND TYPE OR TIME AND EXPOSURES: 0 COMPARISON: 2022 HISTORY: ORDERING SYSTEM PROVIDED HISTORY: eval SBO TECHNOLOGIST PROVIDED HISTORY: Reason for exam:->eval SBO FINDINGS: Contrast opacifies the stomach and dilated proximal jejunum after being injected through the nasogastric tube on the 1st image. There are multiple dilated loops of small bowel with contrast opacifying only the jejunum. The exam was terminated after 8 hours with the contrast only reaching the distal jejunum. 1. Unchanged small bowel obstruction. XR ABDOMEN (2 VIEWS)    Result Date: 5/20/2022  EXAMINATION: TWO XRAY VIEWS OF THE ABDOMEN 5/20/2022 7:37 am COMPARISON: 05/19/2022, small-bowel follow-through, 16 hours ago. HISTORY: ORDERING SYSTEM PROVIDED HISTORY: Eval Progression of contrast.  **OR DEPENDENT** please obtain at [address]: Please obtain at 7am.  OR dependent Reason for exam:->Eval Progression of contrast.  **OR DEPENDENT** please obtain at 7am Reason for Exam: Eval Progression of contrast. FINDINGS: The nasogastric tube has a normal course, distal side port at gastric body and tip at pylorus. The small bowel is moderately distended. Contrast has migrated to the ileum. No contrast is noted in the colon. There is no pneumoperitoneum. Nasogastric tube is in normal position. Persistent distal small bowel obstruction pattern. The contrast has migrated into the distal small bowel since 16 hours ago.      Assessment//Plan           Hospital Problems           Last Modified POA    * (Principal) Small bowel obstruction ([address] 5/18/2022 Yes        Assessment & Plan    POD 1 from small bowel resection   NG fell out overnight   Having postop pain but denies nausea    Will allow clears today   Pain control   OOB    Electronically signed by Israle Jack MD on 5/21/2022 at 11:08 AM    Electronically signed by Israel Jack MD on 5/21/22 at 11:07 AM EDT

## 2022-05-21 NOTE — OP NOTE
[address] [place]                                 OPERATIVE REPORT    PATIENT NAME: Bel Marquez                   :        1990  MED REC NO:   [id number]                          ROOM:       [id number]  ACCOUNT NO:   [de-identified]                           ADMIT DATE: 2022  PROVIDER:     Ann Lezama MD      DATE OF PROCEDURE:  2022    PREOPERATIVE DIAGNOSES:  Small bowel obstruction. POSTOPERATIVE DIAGNOSES:  Small bowel obstruction. OPERATION PERFORMED:  Laparoscopic lysis of adhesions with open small  bowel resection. SURGEON:  Ann Lezama MD    ANESTHESIA:  General endotracheal.    ASA CLASS:  III. DVT PROPHYLAXIS:  The patient was wearing bilateral SCDs. ANTIBIOTICS:  The patient received Ancef 2 gm IV preoperatively. INDICATIONS:  The patient is a 28-year-old female with a history of   x3, underwent tubal ligation, who presented with a 4-day  history of nausea, vomiting, abdominal pain and distention. CT imaging  revealed dilated small bowel down to the distal ileum located in the  pelvis. The patient underwent NG decompression. Serial imaging  including small bowel follow-through showed a persistent complete bowel  obstruction with no progression of contrast past the area of transition. Due to these findings, the risks, benefits, and alternatives were  explained to the patient and she was willing to consent for the  procedure. OPERATIVE PROCEDURE:  After informed consent was obtained, the patient  was brought to the operating room and placed in the supine position. General anesthesia was induced. The patient was then prepped and draped  in the usual sterile fashion. A time-out was then performed. We first started with a supraumbilical incision.   A penetrating towel  clip was used to elevate the abdominal wall and a Veress needle was  inserted in the abdominal cavity. The abdomen was then insufflated with  carbon dioxide gas to a pressure of 15 mmHg. A 5-mm trocar followed by  a camera were then placed. There was no evidence of injury with our  Veress needle or trocar placement. We then placed two additional  trocars along the left abdominal wall. From here, we began to evaluate  the abdominal cavity. There were obvious very dilated loops of small  bowel. The patient also had some omental adhesions to the anterior  abdominal wall and the uterus. We first started by taking down these  adhesions from the anterior abdominal wall. This was done using  scissors with electrocautery. This was carried all the way down. The  omentum was fully freed from the abdominal wall along with the uterus. We then moved the patient into relatively steep Trendelenburg. This  allowed us to view the pelvis. We could easily track a dilated loop of  small bowel down into the pelvis. Similarly, the small bowel was run  from the cecum proximally. This was all collapsed and it met at a  junction with the dilator _____ going into what appeared to be a small  defect or opening within the peritoneum down posterior to the vagina but  away from the rectum. The opening was cut using Metzenbaum scissors and  this was used to free the knuckle of small bowel within the defect  itself. I was concerned with the appearance of the knuckle of small  bowel because there were two rings on either side of where the  compression was created with the inlet of the peritoneal defect and  these two rings appeared to be ischemic. We then elected to perform a small bowel resection for this previously  incarcerated small bowel loop. An infraumbilical incision was then made  and electrocautery was used to divide through the subcutaneous tissue  and fascia. Electrocautery was also used to divide through the  peritoneum as we entered into abdominal cavity.   Small wound protector  was then placed and the concerning area was extracorporealized. Again,  the small bowel did appear to have two rings of potential ischemia that  had ischemic changes. We elected to perform a small bowel resection of  this area. Overall, the piece appeared to be 5 to 6 cm long. We first started by creating mesenteric windows both proximally and  distally. CASSANDRA stapler with blue load was then used to divide the small  bowel. The Hermila clamps were used to compress and tie off the mesentery  and the piece of small bowel was sent off to Pathology for further  evaluation. We then aligned our small bowel loops to create a  side-to-side functional end-to-end small bowel anastomosis. Silk  sutures were used to place a crotch stitch. The corners of the staple  line were then removed sharply and a CASSANDRA stapler with blue load was used  to create our common enterotomy. The remaining opening was then  elevated and a TX-60 stapler was used to close the common enterotomy  thereby closing the anastomosis. The corners of the staple line were  then imbricated. There was brisk bleeding from the edges of the staple  line that were controlled with electrocautery. The anastomosis was  palpated and was widely patent. It was then reduced back in the  abdominal cavity. The fascia was then closed with 0 loop PDS sutures  from superiorly and inferiorly. The wound was then irrigated. The  abdomen was then insufflated back with carbon dioxide gas. There was no  evidence of ongoing bleeding and all free fluid was suctioned out. The  omentum was then brought back down overlying the small bowel to  hopefully adhese back to the anterior abdominal wall. At this point, we  allowed the abdomen to deflate. The infraumbilical incision was closed  with 3-0 and 4-0 Vicryl suture. The remaining 5-mm trocar sites were  closed with 4-0 Vicryl suture. Dermabond was placed over top of the  incisions. Marcaine was injected at the end of the operation. Overall,  the patient tolerated the procedure well and was taken to the recovery  room in stable condition. FINDINGS:  Knuckle of small bowel within the pelvis and peritoneal  defect with rings of ischemic change. ESTIMATED BLOOD LOSS:  Less than 50 mL. COMPLICATIONS:  None. SPECIMEN:  Small bowel.         Ryne Pratt MD    D: 05/20/2022 23:25:05       T: 05/21/2022 5:40:18     [id number]  Job#: [id number]     Doc#: [id number]    CC:

## 2022-05-21 NOTE — PROGRESS NOTES
Pt A&O in the bed. Pt remains NPO at this time. AM medications administered via IV. Pt complains of pain. PRN Morphine administered. Will monitor. Incisions to abdomen are clean, dry and intact with surgical glue. No drainage noted. UAL in the room. Call light within reach. Able to make needs known. Fall precautions in place. Will monitor.  Electronically signed by Kishan Chung RN on 5/21/2022 at 9:02 AM

## 2022-05-21 NOTE — PLAN OF CARE
Problem: Discharge Planning  Goal: Discharge to home or other facility with appropriate resources  5/21/2022 0400 by Luca Yoo RN  Outcome: Progressing  5/20/2022 1808 by Natalie Martinez RN  Outcome: Progressing     Problem: Pain  Goal: Verbalizes/displays adequate comfort level or baseline comfort level  5/21/2022 0400 by Luca Yoo RN  Outcome: Progressing  5/20/2022 1808 by Natalie Martinez RN  Outcome: Progressing     Problem: Safety - Adult  Goal: Free from fall injury  5/20/2022 1808 by Natalie Martinez RN  Outcome: Progressing     Problem: ABCDS Injury Assessment  Goal: Absence of physical injury  5/20/2022 1808 by Natalie Martinez RN  Outcome: Progressing

## 2022-05-22 PROCEDURE — 6370000000 HC RX 637 (ALT 250 FOR IP): Performed by: SURGERY

## 2022-05-22 PROCEDURE — 94760 N-INVAS EAR/PLS OXIMETRY 1: CPT

## 2022-05-22 PROCEDURE — 2580000003 HC RX 258: Performed by: SURGERY

## 2022-05-22 PROCEDURE — 6360000002 HC RX W HCPCS: Performed by: SURGERY

## 2022-05-22 PROCEDURE — C9113 INJ PANTOPRAZOLE SODIUM, VIA: HCPCS | Performed by: SURGERY

## 2022-05-22 PROCEDURE — 1200000000 HC SEMI PRIVATE

## 2022-05-22 RX ADMIN — ACETAMINOPHEN 650 MG: 325 TABLET ORAL at 07:24

## 2022-05-22 RX ADMIN — PANTOPRAZOLE SODIUM 40 MG: 40 INJECTION, POWDER, FOR SOLUTION INTRAVENOUS at 09:20

## 2022-05-22 RX ADMIN — OXYCODONE 5 MG: 5 TABLET ORAL at 18:35

## 2022-05-22 RX ADMIN — ACETAMINOPHEN 650 MG: 325 TABLET ORAL at 12:15

## 2022-05-22 RX ADMIN — KETOROLAC TROMETHAMINE 30 MG: 30 INJECTION, SOLUTION INTRAMUSCULAR at 00:50

## 2022-05-22 RX ADMIN — HYDROMORPHONE HYDROCHLORIDE 0.5 MG: 1 INJECTION, SOLUTION INTRAMUSCULAR; INTRAVENOUS; SUBCUTANEOUS at 19:20

## 2022-05-22 RX ADMIN — Medication 10 ML: at 09:21

## 2022-05-22 RX ADMIN — KETOROLAC TROMETHAMINE 30 MG: 30 INJECTION, SOLUTION INTRAMUSCULAR at 09:20

## 2022-05-22 RX ADMIN — OXYCODONE 5 MG: 5 TABLET ORAL at 12:16

## 2022-05-22 RX ADMIN — HYDROMORPHONE HYDROCHLORIDE 0.5 MG: 1 INJECTION, SOLUTION INTRAMUSCULAR; INTRAVENOUS; SUBCUTANEOUS at 14:38

## 2022-05-22 RX ADMIN — ACETAMINOPHEN 650 MG: 325 TABLET ORAL at 18:35

## 2022-05-22 RX ADMIN — ENOXAPARIN SODIUM 30 MG: 100 INJECTION SUBCUTANEOUS at 09:21

## 2022-05-22 RX ADMIN — SODIUM CHLORIDE, POTASSIUM CHLORIDE, SODIUM LACTATE AND CALCIUM CHLORIDE: 600; 310; 30; 20 INJECTION, SOLUTION INTRAVENOUS at 09:40

## 2022-05-22 RX ADMIN — ACETAMINOPHEN 650 MG: 325 TABLET ORAL at 00:50

## 2022-05-22 RX ADMIN — HYDROMORPHONE HYDROCHLORIDE 0.5 MG: 1 INJECTION, SOLUTION INTRAMUSCULAR; INTRAVENOUS; SUBCUTANEOUS at 04:27

## 2022-05-22 RX ADMIN — OXYCODONE 5 MG: 5 TABLET ORAL at 00:49

## 2022-05-22 RX ADMIN — HYDROMORPHONE HYDROCHLORIDE 0.5 MG: 1 INJECTION, SOLUTION INTRAMUSCULAR; INTRAVENOUS; SUBCUTANEOUS at 09:20

## 2022-05-22 RX ADMIN — OXYCODONE 5 MG: 5 TABLET ORAL at 07:24

## 2022-05-22 ASSESSMENT — PAIN DESCRIPTION - DESCRIPTORS
DESCRIPTORS: ACHING

## 2022-05-22 ASSESSMENT — PAIN SCALES - GENERAL
PAINLEVEL_OUTOF10: 8
PAINLEVEL_OUTOF10: 7
PAINLEVEL_OUTOF10: 7
PAINLEVEL_OUTOF10: 9
PAINLEVEL_OUTOF10: 6
PAINLEVEL_OUTOF10: 7
PAINLEVEL_OUTOF10: 10
PAINLEVEL_OUTOF10: 7
PAINLEVEL_OUTOF10: 8
PAINLEVEL_OUTOF10: 10
PAINLEVEL_OUTOF10: 10
PAINLEVEL_OUTOF10: 9

## 2022-05-22 ASSESSMENT — PAIN DESCRIPTION - PAIN TYPE
TYPE: SURGICAL PAIN
TYPE: SURGICAL PAIN
TYPE: ACUTE PAIN;SURGICAL PAIN
TYPE: SURGICAL PAIN
TYPE: SURGICAL PAIN
TYPE: ACUTE PAIN;SURGICAL PAIN
TYPE: SURGICAL PAIN

## 2022-05-22 ASSESSMENT — PAIN DESCRIPTION - LOCATION
LOCATION: ABDOMEN

## 2022-05-22 ASSESSMENT — PAIN DESCRIPTION - ORIENTATION
ORIENTATION: MID
ORIENTATION: LOWER;MID
ORIENTATION: MID
ORIENTATION: LOWER;MID
ORIENTATION: MID

## 2022-05-22 ASSESSMENT — PAIN - FUNCTIONAL ASSESSMENT
PAIN_FUNCTIONAL_ASSESSMENT: ACTIVITIES ARE NOT PREVENTED

## 2022-05-22 ASSESSMENT — PAIN DESCRIPTION - ONSET
ONSET: ON-GOING

## 2022-05-22 ASSESSMENT — PAIN DESCRIPTION - FREQUENCY
FREQUENCY: CONTINUOUS

## 2022-05-22 ASSESSMENT — ENCOUNTER SYMPTOMS
VOMITING: 0
NAUSEA: 0

## 2022-05-22 ASSESSMENT — PAIN SCALES - WONG BAKER: WONGBAKER_NUMERICALRESPONSE: 0

## 2022-05-22 NOTE — PLAN OF CARE
Problem: Discharge Planning  Goal: Discharge to home or other facility with appropriate resources  Outcome: Progressing  Flowsheets (Taken 5/21/2022 2331)  Discharge to home or other facility with appropriate resources:   Identify barriers to discharge with patient and caregiver   Arrange for needed discharge resources and transportation as appropriate   Identify discharge learning needs (meds, wound care, etc)     Problem: Pain  Goal: Verbalizes/displays adequate comfort level or baseline comfort level  Outcome: Progressing  Flowsheets (Taken 5/21/2022 2331)  Verbalizes/displays adequate comfort level or baseline comfort level:   Encourage patient to monitor pain and request assistance   Assess pain using appropriate pain scale   Administer analgesics based on type and severity of pain and evaluate response   Implement non-pharmacological measures as appropriate and evaluate response     Problem: Safety - Adult  Goal: Free from fall injury  Outcome: Progressing  Flowsheets (Taken 5/21/2022 2331)  Free From Fall Injury:   Instruct family/caregiver on patient safety   Based on caregiver fall risk screen, instruct family/caregiver to ask for assistance with transferring infant if caregiver noted to have fall risk factors     Problem: ABCDS Injury Assessment  Goal: Absence of physical injury  Outcome: Progressing  Flowsheets (Taken 5/21/2022 2331)  Absence of Physical Injury: Implement safety measures based on patient assessment

## 2022-05-22 NOTE — PLAN OF CARE
Problem: Discharge Planning  Goal: Discharge to home or other facility with appropriate resources  Outcome: Progressing  Note: Pt will be discharged to appropriate level of care. Plan is to return home. SW and medical team are following. Will monitor. Problem: Pain  Goal: Verbalizes/displays adequate comfort level or baseline comfort level  Outcome: Progressing  Note: Pt assessed for pain. Pt in pain and assessed with 0-10 pain rating scale. Pt given prescribed analgesic for pain. (See eMar) Pt satisfied with pain relief thus far. Will reassess and continue to monitor. Problem: Safety - Adult  Goal: Free from fall injury  Outcome: Progressing  Note: Pt is free of injury. No injury noted. Fall precautions in place. Call light within reach. Will monitor. Problem: ABCDS Injury Assessment  Goal: Absence of physical injury  Outcome: Progressing  Note: Pt is free of injury. No injury noted. Fall precautions in place. Call light within reach. Will monitor.

## 2022-05-22 NOTE — PROGRESS NOTES
Pt siting up in chair. Redness noted to incision on abdomen. Redness outlined. Has not increased in size. Will continue to monitor. Pt eating turkey in the room when this RN entered room. Educated patient on diet restrictions. Pt stated that she had a small formed kathryn colored BM at 1915. No N/V. Continues with pain. PRN medications administered. Will monitor. Ambulating in the hallway multiple times this shift.  Electronically signed by Elana Mclain RN on 5/22/2022 at 7:26 PM

## 2022-05-22 NOTE — PROGRESS NOTES
Progress Note  Date:2022       Room:[id number]-  Patient Name:Orquidea Armstrong     YOB: 1990     Age:31 y.o. Subjective    Subjective:  Symptoms:  Improved. Diet:  No nausea or vomiting. Activity level: Returning to normal.    Pain:  She complains of pain that is moderate. Review of Systems   Gastrointestinal: Negative for nausea and vomiting. Objective         Vitals Last 24 Hours:  TEMPERATURE:  Temp  Av.5 °F (36.9 °C)  Min: 98.4 °F (36.9 °C)  Max: 98.6 °F (37 °C)  RESPIRATIONS RANGE: Resp  Av  Min: 16  Max: 16  PULSE OXIMETRY RANGE: SpO2  Av %  Min: 97 %  Max: 97 %  PULSE RANGE: Pulse  Av.5  Min: 89  Max: 90  BLOOD PRESSURE RANGE: Systolic (27QAS), GWC:408 , Min:105 , OMV:597   ; Diastolic (30QDD), EFL:28, Min:70, Max:71    I/O (24Hr): Intake/Output Summary (Last 24 hours) at 2022 1021  Last data filed at 2022 0659  Gross per 24 hour   Intake 3583.35 ml   Output --   Net 3583.35 ml     Objective  Labs/Imaging/Diagnostics    Labs:  CBC:  Recent Labs     22  0458   WBC 4.6   RBC 4.10   HGB 12.6   HCT 37.3   MCV 90.8   RDW 12.5        CHEMISTRIES:  Recent Labs     22  0458   *   K 4.2   CL 96*   CO2 25   BUN 9   CREATININE <0.5*   GLUCOSE 112*   PHOS 2.2*   MG 2.00     PT/INR:No results for input(s): PROTIME, INR in the last 72 hours. APTT:No results for input(s): APTT in the last 72 hours. LIVER PROFILE:No results for input(s): AST, ALT, BILIDIR, BILITOT, ALKPHOS in the last 72 hours. Imaging Last 24 Hours:  No results found.   Assessment//Plan           Hospital Problems           Last Modified POA    * (Principal) Small bowel obstruction ([address] 2022 Yes        Assessment & Plan    SBO   POD 2 from bowel resection   Passing flatus, tolerating clears   Advance to fulls today   OOB    Electronically signed by Sil Gould MD on 2022 at 10:22 AM    Electronically signed by [facility] Alexander Mayberry MD on 5/22/22 at 10:21 AM EDT

## 2022-05-22 NOTE — PROGRESS NOTES
Pt A&O in the bed. Pt tolerating PO intake well. AM medication administered without complication. Pt has complaints of pain. PRN medication administered. Will monitor. Incisions are clean, dry and intact. No drainage noted. Pt passing gas and has active bowel sounds. No BM. No complaints of N/V. Tolerating clear liquids well. MD in room. Diet advanced to full liquids. Pt ambulating in the hallway. Call light within reach. Able to make needs known. Fall precautions in place. Will monitor.  Electronically signed by Ministerio Dorado RN on 5/22/2022 at 3:28 PM

## 2022-05-22 NOTE — PROGRESS NOTES
PT AAO x4 per this shift. Pt toleraring diet without complaints. Pt c/o of ongoing pain to ABD. Managed with Prn medication per MD orders, ambulation, splinting and emotional support. Pt encouraged to walk per this shift. Pt stating passing flatulence but no BM yet. No further needs voiced at this time. Fall precautions in place. Call light within reach. Will continue to round.  Electronically signed by Lopez Abrams RN on 5/22/2022 at 8:13 AM

## 2022-05-23 VITALS
HEIGHT: 63 IN | DIASTOLIC BLOOD PRESSURE: 74 MMHG | OXYGEN SATURATION: 98 % | TEMPERATURE: 98 F | WEIGHT: 115.3 LBS | HEART RATE: 90 BPM | BODY MASS INDEX: 20.43 KG/M2 | SYSTOLIC BLOOD PRESSURE: 116 MMHG | RESPIRATION RATE: 20 BRPM

## 2022-05-23 PROCEDURE — 6360000002 HC RX W HCPCS: Performed by: SURGERY

## 2022-05-23 PROCEDURE — 99024 POSTOP FOLLOW-UP VISIT: CPT | Performed by: SURGERY

## 2022-05-23 PROCEDURE — 2580000003 HC RX 258: Performed by: SURGERY

## 2022-05-23 PROCEDURE — C9113 INJ PANTOPRAZOLE SODIUM, VIA: HCPCS | Performed by: SURGERY

## 2022-05-23 PROCEDURE — 6370000000 HC RX 637 (ALT 250 FOR IP): Performed by: SURGERY

## 2022-05-23 PROCEDURE — 94760 N-INVAS EAR/PLS OXIMETRY 1: CPT

## 2022-05-23 RX ORDER — IBUPROFEN 800 MG/1
800 TABLET ORAL EVERY 8 HOURS PRN
Qty: 60 TABLET | Refills: 0 | Status: SHIPPED | OUTPATIENT
Start: 2022-05-23

## 2022-05-23 RX ORDER — AMOXICILLIN AND CLAVULANATE POTASSIUM 875; 125 MG/1; MG/1
1 TABLET, FILM COATED ORAL 2 TIMES DAILY
Qty: 14 TABLET | Refills: 0 | Status: SHIPPED | OUTPATIENT
Start: 2022-05-23 | End: 2022-05-30

## 2022-05-23 RX ORDER — HYDROCORTISONE 25 MG/G
CREAM TOPICAL EVERY 6 HOURS PRN
Status: DISCONTINUED | OUTPATIENT
Start: 2022-05-23 | End: 2022-05-23 | Stop reason: HOSPADM

## 2022-05-23 RX ORDER — LANOLIN ALCOHOL/MO/W.PET/CERES
3 CREAM (GRAM) TOPICAL NIGHTLY PRN
Status: DISCONTINUED | OUTPATIENT
Start: 2022-05-23 | End: 2022-05-23 | Stop reason: HOSPADM

## 2022-05-23 RX ORDER — DOCUSATE SODIUM 100 MG/1
100 CAPSULE, LIQUID FILLED ORAL 2 TIMES DAILY PRN
Qty: 60 CAPSULE | Refills: 0 | Status: SHIPPED | OUTPATIENT
Start: 2022-05-23

## 2022-05-23 RX ORDER — OXYCODONE HYDROCHLORIDE AND ACETAMINOPHEN 5; 325 MG/1; MG/1
1-2 TABLET ORAL EVERY 6 HOURS PRN
Qty: 20 TABLET | Refills: 0 | Status: SHIPPED | OUTPATIENT
Start: 2022-05-23 | End: 2022-05-26 | Stop reason: SDUPTHER

## 2022-05-23 RX ADMIN — ENOXAPARIN SODIUM 30 MG: 100 INJECTION SUBCUTANEOUS at 10:12

## 2022-05-23 RX ADMIN — HYDROMORPHONE HYDROCHLORIDE 0.5 MG: 1 INJECTION, SOLUTION INTRAMUSCULAR; INTRAVENOUS; SUBCUTANEOUS at 06:33

## 2022-05-23 RX ADMIN — HYDROMORPHONE HYDROCHLORIDE 0.5 MG: 1 INJECTION, SOLUTION INTRAMUSCULAR; INTRAVENOUS; SUBCUTANEOUS at 03:38

## 2022-05-23 RX ADMIN — PANTOPRAZOLE SODIUM 40 MG: 40 INJECTION, POWDER, FOR SOLUTION INTRAVENOUS at 10:12

## 2022-05-23 RX ADMIN — OXYCODONE 5 MG: 5 TABLET ORAL at 10:12

## 2022-05-23 RX ADMIN — OXYCODONE 5 MG: 5 TABLET ORAL at 02:05

## 2022-05-23 RX ADMIN — ACETAMINOPHEN 650 MG: 325 TABLET ORAL at 02:05

## 2022-05-23 RX ADMIN — Medication 10 ML: at 10:15

## 2022-05-23 RX ADMIN — ACETAMINOPHEN 650 MG: 325 TABLET ORAL at 06:32

## 2022-05-23 ASSESSMENT — PAIN DESCRIPTION - ORIENTATION
ORIENTATION: MID;LOWER
ORIENTATION: LOWER
ORIENTATION: LOWER
ORIENTATION: LOWER;MID
ORIENTATION: LOWER

## 2022-05-23 ASSESSMENT — PAIN - FUNCTIONAL ASSESSMENT
PAIN_FUNCTIONAL_ASSESSMENT: ACTIVITIES ARE NOT PREVENTED

## 2022-05-23 ASSESSMENT — PAIN DESCRIPTION - FREQUENCY
FREQUENCY: CONTINUOUS

## 2022-05-23 ASSESSMENT — PAIN DESCRIPTION - PAIN TYPE
TYPE: SURGICAL PAIN
TYPE: SURGICAL PAIN
TYPE: ACUTE PAIN;SURGICAL PAIN
TYPE: ACUTE PAIN;SURGICAL PAIN
TYPE: SURGICAL PAIN

## 2022-05-23 ASSESSMENT — PAIN DESCRIPTION - LOCATION
LOCATION: ABDOMEN

## 2022-05-23 ASSESSMENT — PAIN SCALES - GENERAL
PAINLEVEL_OUTOF10: 7
PAINLEVEL_OUTOF10: 9
PAINLEVEL_OUTOF10: 8
PAINLEVEL_OUTOF10: 10
PAINLEVEL_OUTOF10: 9
PAINLEVEL_OUTOF10: 8

## 2022-05-23 ASSESSMENT — PAIN DESCRIPTION - ONSET
ONSET: ON-GOING

## 2022-05-23 ASSESSMENT — PAIN DESCRIPTION - DESCRIPTORS
DESCRIPTORS: ACHING

## 2022-05-23 ASSESSMENT — PAIN SCALES - WONG BAKER: WONGBAKER_NUMERICALRESPONSE: 0

## 2022-05-23 NOTE — PLAN OF CARE
Problem: Discharge Planning  Goal: Discharge to home or other facility with appropriate resources  5/23/2022 1119 by Liseth Moore RN  Outcome: Progressing  Note: Pt will be discharged to appropriate level of care. Plan is to return home. SW and medical team are following. Will monitor. 5/23/2022 0125 by Claudia Mendez RN  Outcome: Progressing  Flowsheets (Taken 5/23/2022 0125)  Discharge to home or other facility with appropriate resources:   Identify barriers to discharge with patient and caregiver   Identify discharge learning needs (meds, wound care, etc)   Refer to discharge planning if patient needs post-hospital services based on physician order or complex needs related to functional status, cognitive ability or social support system   Arrange for needed discharge resources and transportation as appropriate     Problem: Pain  Goal: Verbalizes/displays adequate comfort level or baseline comfort level  5/23/2022 1119 by Liseth Moore RN  Outcome: Progressing  Note: Pt verbalizes adequate comfort level. No complaints at this time. Will monitor. 5/23/2022 0125 by Claudia Mendez RN  Outcome: Progressing  Flowsheets (Taken 5/23/2022 0125)  Verbalizes/displays adequate comfort level or baseline comfort level:   Encourage patient to monitor pain and request assistance   Administer analgesics based on type and severity of pain and evaluate response   Assess pain using appropriate pain scale   Implement non-pharmacological measures as appropriate and evaluate response     Problem: Safety - Adult  Goal: Free from fall injury  5/23/2022 1119 by Liseth Moore RN  Outcome: Progressing  Note: Pt is free of injury. No injury noted. Fall precautions in place. Call light within reach. Will monitor.     5/23/2022 0125 by Claudia Mendez RN  Outcome: Progressing  Flowsheets (Taken 5/23/2022 0125)  Free From Fall Injury:   Based on caregiver fall risk screen, instruct family/caregiver to ask for assistance with transferring Purposeful rounding completed:    Side rails up x 1:  YES  Bed in low position and wheels locked: YES  Call bell within reach: YES  Comfort addressed: YES    Toileting needs addressed:  ml noted in urinal   Plan of care reviewed/updated with patient and or family members: YES  IV site assessed: YES  Pain assessed and addressed: YES, 0 infant if caregiver noted to have fall risk factors   Instruct family/caregiver on patient safety     Problem: ABCDS Injury Assessment  Goal: Absence of physical injury  5/23/2022 1119 by Brain Brown, RN  Outcome: Progressing  Note: Pt is free of injury. No injury noted. Fall precautions in place. Call light within reach. Will monitor.     5/23/2022 0125 by Anne Alegria RN  Outcome: Progressing  Flowsheets (Taken 5/23/2022 0125)  Absence of Physical Injury: Implement safety measures based on patient assessment

## 2022-05-23 NOTE — PROGRESS NOTES
Pt discharged to home. Transported out in wheelchair. Accompanied by spouse. Transported in personal vehicle. Discharge instructions and personal belongings given to pt. Rx sent electronically to [facility]. Explanation of discharge medications and instructions understood by verbal statement. No questions, comments or concerns at this time.  Electronically signed by Jennifer Andrade RN on 5/23/2022 at 1:37 PM

## 2022-05-23 NOTE — PROGRESS NOTES
Pt A&O in the chair. Pt tolerating new diet order. AM medications administered without complications. Pt complains of pain. PRN medication administered. Will monitor. Incision is clean, dry and intact with redness noted. Incision is approximated. Afebrile. UAL in the hallway and room. Call light within reach. Able to make needs known. Fall precautions in place. Will monitor.  Electronically signed by Brain Brown RN on 5/23/2022 at 11:18 AM

## 2022-05-23 NOTE — PROGRESS NOTES
This RN spent a good amount of time talking with the patient about things that are going on with her life and illness. Pt was tearful. At the end of the conversation this RN asked if she would like to talk with spiritual care. Pt was agreeable. Order placed. Pt also requested hemorrhoid cream and something to help her sleep at night. MD notified. Awaiting return call.  Electronically signed by Eli Bejarano RN on 5/23/2022 at 11:41 AM

## 2022-05-23 NOTE — DISCHARGE SUMMARY
Physician Discharge Summary     Patient ID:  Ashley Cedeño  [id number]  36 y.o.  1990    Admit date: 5/17/2022    Discharge date and time: 05/23/22    Admitting Physician: Ayan Meadows MD     Discharge Physician: Ayan Meadows MD    Admission Diagnoses: Small bowel obstruction [facility]) [K56.609]  Thrombosed external hemorrhoid [K64.5]  Prerenal azotemia [R79.89]    Discharge Diagnoses: same    Admission Condition: fair    Discharged Condition: good    Indication for Admission: 29 yo female who presented with a 4 day history of nausea, vomiting, and lack of GI function. She was found to have a small bowel obstruction on CT imaging. Hospital Course: She was treated conservatively for 3 days with NG decompression and IV hydration. Follow up imaging including SBFT showed no progression of contrast.  She was taken to the OR for a laparoscopic lysis of adhesions with open small bowel resection. Her post op course was uneventful. Her NG was removed on POD #1 and her diet was slowly advanced. She was discharged home in stable condition once tolerating a regular diet and having BMs on POD #3. Consults: none    Significant Diagnostic Studies: CT abd/pelvis    Treatments: laparoscopic lysis of adhesions, small bowel resection    Discharge Exam:  Abd soft, ND, appropriately tender, incisions c/d/i, minimal erythema around infraumbilical incision    Disposition: home    In process/preliminary results:  Outstanding Order Results     Date and Time Order Name Status Description    5/17/2022 11:51 PM Microscopic Urinalysis In process     5/17/2022 11:51 PM Urinalysis with Reflex to Culture Preliminary           Patient Instructions:   Current Discharge Medication List      START taking these medications    Details   oxyCODONE-acetaminophen (PERCOCET) 5-325 MG per tablet Take 1-2 tablets by mouth every 6 hours as needed for Pain for up to 7 days. Intended supply: 7 days.  Take lowest dose possible to manage pain  Qty: 20 tablet, Refills: 0    Comments: Reduce doses taken as pain becomes manageable  Associated Diagnoses: Small bowel obstruction (HCC)      docusate sodium (COLACE) 100 MG capsule Take 1 capsule by mouth 2 times daily as needed for Constipation (take while on narcotic pain medication)  Qty: 60 capsule, Refills: 0      amoxicillin-clavulanate (AUGMENTIN) 875-125 MG per tablet Take 1 tablet by mouth 2 times daily for 7 days  Qty: 14 tablet, Refills: 0         CONTINUE these medications which have CHANGED    Details   ibuprofen (ADVIL;MOTRIN) 800 MG tablet Take 1 tablet by mouth every 8 hours as needed for Pain  Qty: 60 tablet, Refills: 0           Activity: no lifting greater than 15 lbs x 6 weeks  Diet: regular diet  Wound Care: as directed    Follow-up with Dr. Vincent Baltazar in 2 weeks.     Signed:  Ekta Hoff MD  5/23/2022  1:36 PM

## 2022-05-23 NOTE — PROGRESS NOTES
PT AAO x4 per this shift VSS. Pt able to ambulate halls per this shift. Redness to lower midline incision noted but not increased. Ice and splinting encouraged. ABD remains slightly distended. Pt showing per this shift. Pt still having intermittent ABD pain but stating it is better controlled. No further needs voiced at this time. Fall precautions in place. Call light within reach. Will continue to monitor.    Electronically signed by Reyes Skiff, RN on 5/23/2022 at 6:01 AM

## 2022-05-23 NOTE — PLAN OF CARE
Problem: Discharge Planning  Goal: Discharge to home or other facility with appropriate resources  5/23/2022 0125 by Edwin Zamudio RN  Outcome: Progressing  Flowsheets (Taken 5/23/2022 0125)  Discharge to home or other facility with appropriate resources:   Identify barriers to discharge with patient and caregiver   Identify discharge learning needs (meds, wound care, etc)   Refer to discharge planning if patient needs post-hospital services based on physician order or complex needs related to functional status, cognitive ability or social support system   Arrange for needed discharge resources and transportation as appropriate  5/22/2022 1529 by Navarro Silva RN  Outcome: Progressing  Note: Pt will be discharged to appropriate level of care. Plan is to return home. SW and medical team are following. Will monitor. Problem: Pain  Goal: Verbalizes/displays adequate comfort level or baseline comfort level  5/23/2022 0125 by Edwin Zamudio RN  Outcome: Progressing  Flowsheets (Taken 5/23/2022 0125)  Verbalizes/displays adequate comfort level or baseline comfort level:   Encourage patient to monitor pain and request assistance   Administer analgesics based on type and severity of pain and evaluate response   Assess pain using appropriate pain scale   Implement non-pharmacological measures as appropriate and evaluate response  5/22/2022 1529 by Navarro Silva RN  Outcome: Progressing  Note: Pt assessed for pain. Pt in pain and assessed with 0-10 pain rating scale. Pt given prescribed analgesic for pain. (See eMar) Pt satisfied with pain relief thus far. Will reassess and continue to monitor.        Problem: Safety - Adult  Goal: Free from fall injury  5/23/2022 0125 by Edwin Zamudio RN  Outcome: Progressing  Flowsheets (Taken 5/23/2022 0125)  Free From Fall Injury:   Based on caregiver fall risk screen, instruct family/caregiver to ask for assistance with transferring infant if caregiver noted to have fall risk factors   Instruct family/caregiver on patient safety  5/22/2022 1529 by Mandeep Knox RN  Outcome: Progressing  Note: Pt is free of injury. No injury noted. Fall precautions in place. Call light within reach. Will monitor. Problem: ABCDS Injury Assessment  Goal: Absence of physical injury  5/23/2022 0125 by Selena Miranda RN  Outcome: Progressing  Flowsheets (Taken 5/23/2022 0125)  Absence of Physical Injury: Implement safety measures based on patient assessment  5/22/2022 1529 by Mandeep Knox RN  Outcome: Progressing  Note: Pt is free of injury. No injury noted. Fall precautions in place. Call light within reach. Will monitor.

## 2022-05-25 ENCOUNTER — TELEPHONE (OUTPATIENT)
Dept: SURGERY | Age: 32
End: 2022-05-25

## 2022-05-25 NOTE — TELEPHONE ENCOUNTER
Pt calling in wanting more pain meds, she has an appt tomorrow however she is wanting to get through the night, message out to [employer].

## 2022-05-26 ENCOUNTER — OFFICE VISIT (OUTPATIENT)
Dept: SURGERY | Age: 32
End: 2022-05-26

## 2022-05-26 VITALS
BODY MASS INDEX: 21.26 KG/M2 | SYSTOLIC BLOOD PRESSURE: 119 MMHG | DIASTOLIC BLOOD PRESSURE: 84 MMHG | WEIGHT: 120 LBS | HEART RATE: 98 BPM

## 2022-05-26 DIAGNOSIS — T14.8XXA WOUND INFECTION: ICD-10-CM

## 2022-05-26 DIAGNOSIS — K56.609 SMALL BOWEL OBSTRUCTION (HCC): Primary | ICD-10-CM

## 2022-05-26 DIAGNOSIS — K64.9 HEMORRHOIDS WITHOUT COMPLICATION: ICD-10-CM

## 2022-05-26 DIAGNOSIS — L08.9 WOUND INFECTION: ICD-10-CM

## 2022-05-26 PROCEDURE — 99024 POSTOP FOLLOW-UP VISIT: CPT | Performed by: SURGERY

## 2022-05-26 RX ORDER — HYDROCORTISONE 25 MG/G
CREAM TOPICAL
Qty: 1 EACH | Refills: 2 | Status: SHIPPED | OUTPATIENT
Start: 2022-05-26

## 2022-05-26 RX ORDER — OXYCODONE HYDROCHLORIDE AND ACETAMINOPHEN 5; 325 MG/1; MG/1
1-2 TABLET ORAL EVERY 6 HOURS PRN
Qty: 20 TABLET | Refills: 0 | Status: SHIPPED | OUTPATIENT
Start: 2022-05-26 | End: 2022-06-02

## 2022-05-26 NOTE — PROGRESS NOTES
Post Operative Visit    [address] [facility]  [place] [facility]   Richa Iqbal MD    [address], [address]  [facility] [place]  Phone: [phone]  Fax: [phone]    Samantha Bruce   YOB: 1990    Date of Visit:  5/26/2022    No ref. provider found  Gael Stapleton    Subjective:     Samantha Bruce presents for a one week follow-up s/p laparoscopic lysis of adhesions converted to open small bowel resection. Overall she has been doing well OK since  her discharge. She had increased pain and drainage from her lower midline wound which has improved since yesterday. She has been eating/drinking without difficulty. Her bowel movements have returned to normal.  There has been no relief of her pain. It is  controlled with her current pain regimen but she is almost out of percocet. She denies any fevers or chills. No Known Allergies  No outpatient medications have been marked as taking for the 5/26/22 encounter (Office Visit) with Fawad Conti MD.       Vitals:    05/26/22 0949   BP: 119/84   Pulse: 98   Weight: 120 lb (54.4 kg)     Body mass index is 21.26 kg/m².      Wt Readings from Last 3 Encounters:   05/26/22 120 lb (54.4 kg)   05/22/22 115 lb 4.8 oz (52.3 kg)   08/24/21 144 lb 2.9 oz (65.4 kg)     BP Readings from Last 3 Encounters:   05/26/22 119/84   05/23/22 116/74   08/24/21 110/60          Objective:    CONSTITUTIONAL:  awake, alert, no apparent distress    LUNGS:  Resp easy and unlabored  ABDOMEN:  Laparoscopic incisions c/d/i, infraumbilical midline incision with improved erythema from her hospital stay, minimal clear brown drainage, soft, non-distended, appropriately tender, voluntary guarding absent,  and hernia absent  MUSCULOSKELETAL: No edema  NEUROLOGIC:  Mental Status Exam:  Level of Alertness:   awake  Orientation:   person, place, time  SKIN: as above      Pathology:  FINAL DIAGNOSIS:     Small bowel, partial resection:   -Serosal hemorrhage and fibrous adhesion.   -Negative for malignancy.  BRANDON/BRANDON     ASSESSMENT:     Diagnosis Orders   1. Small bowel obstruction ([address]         PLAN:    Phyllis Bedoya is doing well s/p laparoscopic lysis of adhesions with open small bowel resection. She is continuing to have drainage with her midline incision. It was opened at bedside. Lidocaine was injected and the inferior half of the incision was opened. There was minimal drainage. Laparoscopic incisions are healing appropriately. Will plan on having her follow up next week for a wound check. She is to continue with lifting restrictions for the next 4 weeks to reduce the chance of hernia.       Electronically signed by Ekta Hoff MD on 5/26/2022 at 10:08 AM

## 2022-05-27 ENCOUNTER — TELEPHONE (OUTPATIENT)
Dept: SURGERY | Age: 32
End: 2022-05-27

## 2022-05-27 NOTE — TELEPHONE ENCOUNTER
Patients  called to say the pharmacy will not fill her RX. They said it is too early to fill. Explained to him that we wrote the RX, she took too many to begin with and the RX ran out sooner than it should have. Patient needs to wait for the RX to come due and then she can fill it.  was very upset and was using strong language  and said they were going to go back to the ED if we didn't give her anything. He then hung up on me. Informed JNG.

## 2022-07-10 ENCOUNTER — HOSPITAL ENCOUNTER (EMERGENCY)
Age: 32
Discharge: HOME | End: 2022-07-10

## 2023-02-04 ENCOUNTER — APPOINTMENT (OUTPATIENT)
Dept: CT IMAGING | Age: 33
End: 2023-02-04
Payer: MEDICAID

## 2023-02-04 ENCOUNTER — HOSPITAL ENCOUNTER (EMERGENCY)
Age: 33
Discharge: HOME | End: 2023-02-04
Attending: EMERGENCY MEDICINE
Payer: MEDICAID

## 2023-02-04 VITALS
WEIGHT: 105.6 LBS | RESPIRATION RATE: 16 BRPM | HEART RATE: 89 BPM | BODY MASS INDEX: 18.03 KG/M2 | DIASTOLIC BLOOD PRESSURE: 86 MMHG | OXYGEN SATURATION: 99 % | TEMPERATURE: 98.1 F | SYSTOLIC BLOOD PRESSURE: 138 MMHG | HEIGHT: 64 IN

## 2023-02-04 DIAGNOSIS — N93.8 DYSFUNCTIONAL UTERINE BLEEDING: ICD-10-CM

## 2023-02-04 DIAGNOSIS — B96.89 BACTERIAL VAGINOSIS: ICD-10-CM

## 2023-02-04 DIAGNOSIS — N30.00 ACUTE CYSTITIS WITHOUT HEMATURIA: ICD-10-CM

## 2023-02-04 DIAGNOSIS — N76.0 BACTERIAL VAGINOSIS: ICD-10-CM

## 2023-02-04 DIAGNOSIS — R10.9 ACUTE ABDOMINAL PAIN: Primary | ICD-10-CM

## 2023-02-04 LAB
A/G RATIO: 1.5 (ref 1.1–2.2)
ALBUMIN SERPL-MCNC: 4.2 G/DL (ref 3.4–5)
ALP BLD-CCNC: 44 U/L (ref 40–129)
ALT SERPL-CCNC: 8 U/L (ref 10–40)
ANION GAP SERPL CALCULATED.3IONS-SCNC: 10 MMOL/L (ref 3–16)
AST SERPL-CCNC: 16 U/L (ref 15–37)
BACTERIA WET PREP: ABNORMAL
BACTERIA: ABNORMAL /HPF
BASOPHILS ABSOLUTE: 0.1 K/UL (ref 0–0.2)
BASOPHILS RELATIVE PERCENT: 1.2 %
BILIRUB SERPL-MCNC: 0.3 MG/DL (ref 0–1)
BILIRUBIN URINE: NEGATIVE
BLOOD, URINE: ABNORMAL
BUN BLDV-MCNC: 15 MG/DL (ref 7–20)
CALCIUM SERPL-MCNC: 9 MG/DL (ref 8.3–10.6)
CHLORIDE BLD-SCNC: 104 MMOL/L (ref 99–110)
CLARITY: CLEAR
CLUE CELLS: ABNORMAL
CO2: 25 MMOL/L (ref 21–32)
COLOR: YELLOW
CREAT SERPL-MCNC: 0.6 MG/DL (ref 0.6–1.1)
EOSINOPHILS ABSOLUTE: 0.1 K/UL (ref 0–0.6)
EOSINOPHILS RELATIVE PERCENT: 1.8 %
EPITHELIAL CELLS WET PREP: ABNORMAL
EPITHELIAL CELLS, UA: ABNORMAL /HPF (ref 0–5)
GFR SERPL CREATININE-BSD FRML MDRD: >60 ML/MIN/{1.73_M2}
GLUCOSE BLD-MCNC: 83 MG/DL (ref 70–99)
GLUCOSE URINE: NEGATIVE MG/DL
HCG(URINE) PREGNANCY TEST: NEGATIVE
HCT VFR BLD CALC: 35.3 % (ref 36–48)
HEMOGLOBIN: 12.2 G/DL (ref 12–16)
KETONES, URINE: NEGATIVE MG/DL
LEUKOCYTE ESTERASE, URINE: ABNORMAL
LIPASE: 23 U/L (ref 13–60)
LYMPHOCYTES ABSOLUTE: 1.2 K/UL (ref 1–5.1)
LYMPHOCYTES RELATIVE PERCENT: 27.5 %
MCH RBC QN AUTO: 30.7 PG (ref 26–34)
MCHC RBC AUTO-ENTMCNC: 34.7 G/DL (ref 31–36)
MCV RBC AUTO: 88.6 FL (ref 80–100)
MICROSCOPIC EXAMINATION: YES
MONOCYTES ABSOLUTE: 0.4 K/UL (ref 0–1.3)
MONOCYTES RELATIVE PERCENT: 9.6 %
NEUTROPHILS ABSOLUTE: 2.6 K/UL (ref 1.7–7.7)
NEUTROPHILS RELATIVE PERCENT: 59.9 %
NITRITE, URINE: NEGATIVE
PDW BLD-RTO: 12.6 % (ref 12.4–15.4)
PH UA: 6.5 (ref 5–8)
PLATELET # BLD: 242 K/UL (ref 135–450)
PMV BLD AUTO: 8.4 FL (ref 5–10.5)
POTASSIUM REFLEX MAGNESIUM: 4 MMOL/L (ref 3.5–5.1)
PROTEIN UA: 30 MG/DL
RBC # BLD: 3.98 M/UL (ref 4–5.2)
RBC UA: ABNORMAL /HPF (ref 0–4)
RBC WET PREP: ABNORMAL
SODIUM BLD-SCNC: 139 MMOL/L (ref 136–145)
SOURCE WET PREP: ABNORMAL
SPECIFIC GRAVITY UA: 1.02 (ref 1–1.03)
TOTAL PROTEIN: 7 G/DL (ref 6.4–8.2)
TRICHOMONAS PREP: ABNORMAL
URINE REFLEX TO CULTURE: YES
URINE TYPE: ABNORMAL
UROBILINOGEN, URINE: 0.2 E.U./DL
WBC # BLD: 4.4 K/UL (ref 4–11)
WBC UA: ABNORMAL /HPF (ref 0–5)
WBC WET PREP: ABNORMAL
YEAST WET PREP: ABNORMAL

## 2023-02-04 PROCEDURE — 84703 CHORIONIC GONADOTROPIN ASSAY: CPT

## 2023-02-04 PROCEDURE — 6370000000 HC RX 637 (ALT 250 FOR IP): Performed by: EMERGENCY MEDICINE

## 2023-02-04 PROCEDURE — 80053 COMPREHEN METABOLIC PANEL: CPT

## 2023-02-04 PROCEDURE — 74176 CT ABD & PELVIS W/O CONTRAST: CPT

## 2023-02-04 PROCEDURE — 83690 ASSAY OF LIPASE: CPT

## 2023-02-04 PROCEDURE — 87491 CHLMYD TRACH DNA AMP PROBE: CPT

## 2023-02-04 PROCEDURE — 87591 N.GONORRHOEAE DNA AMP PROB: CPT

## 2023-02-04 PROCEDURE — 99284 EMERGENCY DEPT VISIT MOD MDM: CPT

## 2023-02-04 PROCEDURE — 85025 COMPLETE CBC W/AUTO DIFF WBC: CPT

## 2023-02-04 PROCEDURE — 81001 URINALYSIS AUTO W/SCOPE: CPT

## 2023-02-04 PROCEDURE — 87086 URINE CULTURE/COLONY COUNT: CPT

## 2023-02-04 PROCEDURE — 36415 COLL VENOUS BLD VENIPUNCTURE: CPT

## 2023-02-04 PROCEDURE — 87210 SMEAR WET MOUNT SALINE/INK: CPT

## 2023-02-04 RX ORDER — METRONIDAZOLE 500 MG/1
500 TABLET ORAL 2 TIMES DAILY
Qty: 14 TABLET | Refills: 0 | Status: SHIPPED | OUTPATIENT
Start: 2023-02-04 | End: 2023-02-11

## 2023-02-04 RX ORDER — CEPHALEXIN 500 MG/1
500 CAPSULE ORAL 3 TIMES DAILY
Qty: 21 CAPSULE | Refills: 0 | Status: SHIPPED | OUTPATIENT
Start: 2023-02-04 | End: 2023-02-11

## 2023-02-04 RX ORDER — CEPHALEXIN 500 MG/1
500 CAPSULE ORAL ONCE
Status: COMPLETED | OUTPATIENT
Start: 2023-02-04 | End: 2023-02-04

## 2023-02-04 RX ADMIN — CEPHALEXIN 500 MG: 500 CAPSULE ORAL at 04:13

## 2023-02-04 ASSESSMENT — LIFESTYLE VARIABLES
HOW OFTEN DO YOU HAVE A DRINK CONTAINING ALCOHOL: MONTHLY OR LESS
HOW MANY STANDARD DRINKS CONTAINING ALCOHOL DO YOU HAVE ON A TYPICAL DAY: 1 OR 2

## 2023-02-04 ASSESSMENT — PAIN SCALES - GENERAL: PAINLEVEL_OUTOF10: 3

## 2023-02-04 NOTE — ED NOTES
DC instructions and RX info given. Pt verbalized understanding.  DC Home in stable condition     Miryam Burgess RN  02/04/23 [id number]

## 2023-02-04 NOTE — Clinical Note
Rashad Cardenas was seen and treated in our emergency department on 2/4/2023. She may return to work on 02/06/2023. If you have any questions or concerns, please don't hesitate to call.       Jose Whatley, DO

## 2023-02-04 NOTE — ED PROVIDER NOTES
[facility] ENCOUNTER      Pt Name: Ferny Isbell  MRN: [id number]  [place] 1990  Date of evaluation: 2023  Provider: Julita Avalos, [address] [place] [address]       Chief Complaint   Patient presents with    Abdominal Pain    Vaginal Bleeding         HISTORY OF PRESENT ILLNESS   (Location/Symptom, Timing/Onset, Context/Setting, Quality, Duration, Modifying Factors, Severity)  Note limiting factors. Ferny Isbell is a 33 y.o. female who presents to the emergency department with a complaint of left lower quadrant and left flank abdominal pain for the last couple of days. She states that she first developed some discomfort on Wednesday evening to Thursday morning, 2 days ago. Pain has been coming and going lasting a few minutes each time. She denies any dysuria hematuria frequency urgency. She also has had some intermittent vaginal bleeding and passed a couple of small clots. She denies any vaginal discharge. No rash or lesions. She denies any back pain. She denies any upper abdominal pain. She denies any vomiting or diarrhea. No fever or chills. No cough or cold symptoms. No trauma or injury. The first day of her last menstrual period was  which lasted normal duration with normal flow. She reports that this bleeding occurred a couple of days after her last period finished. She is not pregnant. No previous occurrence. She does not take any hormone medications. Surgical history significant for laparoscopic tubal ligation, colectomy, small bowel resection, lysis of adhesions,  section. Nursing Notes were reviewed. HPI        REVIEW OF SYSTEMS    (2-9 systems for level 4, 10 or more for level 5)       Constitutional: Negative for fever or chills. HENT: Negative for rhinorrhea and sore throat. Eyes: Negative for redness or drainage. Respiratory: Negative for shortness of breath or dyspnea on exertion. Cardiovascular: Negative for chest pain. Neurological: Negative for headache. Musculoskeletal:  Negative edema. Hematological: Negative for adenopathy. All systems are reviewed and are negative except for those listed above in the history of present illness and ROS. PAST MEDICAL HISTORY     Past Medical History:   Diagnosis Date    Anxiety     Depression          SURGICAL HISTORY       Past Surgical History:   Procedure Laterality Date     SECTION      COLECTOMY N/A 2022    LAPAROSCOPIC LYSIS OF ADHESIONS WITH SMALL BOWEL RESECTION performed by Rosario Juarez MD at [facility]       Previous Medications    DOCUSATE SODIUM (COLACE) 100 MG CAPSULE    Take 1 capsule by mouth 2 times daily as needed for Constipation (take while on narcotic pain medication)    HYDROCORTISONE (ANUSOL-HC) 2.5 % CREA RECTAL CREAM    Apply to hemorrhoids twice a day. IBUPROFEN (ADVIL;MOTRIN) 800 MG TABLET    Take 1 tablet by mouth every 8 hours as needed for Pain       ALLERGIES     Patient has no known allergies. FAMILY HISTORY     History reviewed. No pertinent family history. SOCIAL HISTORY       Social History     Socioeconomic History    Marital status: Single     Spouse name: None    Number of children: None    Years of education: None    Highest education level: None   Tobacco Use    Smoking status: Every Day    Smokeless tobacco: Never   Substance and Sexual Activity    Alcohol use: No    Drug use: No       SCREENINGS    Moose Coma Scale  Eye Opening: Spontaneous  Best Verbal Response: Oriented  Best Motor Response: Obeys commands  Too Coma Scale Score: 15        PHYSICAL EXAM    (up to 7 for level 4, 8 or more for level 5)     ED Triage Vitals   BP Temp Temp src Pulse Resp SpO2 Height Weight   -- -- -- -- -- -- -- --         Physical Exam   Constitutional: Awake and alert. Very pleasant. Appears comfortable.   Head: No visible evidence of trauma. Normocephalic. Neck:  Soft and supple. Nontender. Heart:  Regular rate and rhythm. Lungs:  No conversational dyspnea or accessory muscle use. Chest: Chest wall non-tender. No evidence of trauma. Abdomen:  Soft, nondistended, bowel sounds present. Mild vague discomfort in the left lower pelvic area. No localizing tenderness. No CVA tenderness. Musculoskeletal: Extremities non-tender with full range of motion. Radial and dorsalis pedis pulses were intact. No calf tenderness erythema or edema. Neurological: Alert and oriented x 3. Speech clear. No acute focal motor or sensory deficits. Skin: Skin is warm and dry. No rash. Lymphatic:  No lympadenopathy. Psychiatric: Normal mood and affect. Behavior is normal.         DIAGNOSTIC RESULTS     EKG: All EKG's are interpreted by me, the Emergency Department Physician, who either signs or Co-signs this chart in the absence of a cardiologist.        RADIOLOGY:   Non-plain film images such as CT, Ultrasound and MRI are read by the radiologist. Plain radiographic images are visualized and preliminarily interpreted by the emergency physician with the below findings:        Interpretation per the Radiologist below, if available at the time of this note:    CT ABDOMEN PELVIS WO CONTRAST Additional Contrast? None   Preliminary Result   No acute findings to the abdomen or pelvis. No findings to explain the   patient's symptoms.                ED BEDSIDE ULTRASOUND:   Performed by ED Physician - none    LABS:  Labs Reviewed   WET PREP, GENITAL - Abnormal; Notable for the following components:       Result Value    Clue Cells, Wet Prep 1+ (*)     All other components within normal limits   URINALYSIS WITH REFLEX TO CULTURE - Abnormal; Notable for the following components:    Blood, Urine LARGE (*)     Protein, UA 30 (*)     Leukocyte Esterase, Urine MODERATE (*)     All other components within normal limits   MICROSCOPIC URINALYSIS - Abnormal; Notable for the following components:    WBC, UA 10-20 (*)     RBC, UA  (*)     Epithelial Cells, UA 6-10 (*)     Bacteria, UA 2+ (*)     All other components within normal limits   CBC WITH AUTO DIFFERENTIAL - Abnormal; Notable for the following components:    RBC 3.98 (*)     Hematocrit 35.3 (*)     All other components within normal limits   COMPREHENSIVE METABOLIC PANEL W/ REFLEX TO MG FOR LOW K - Abnormal; Notable for the following components:    ALT 8 (*)     All other components within normal limits   C.TRACHOMATIS N.GONORRHOEAE DNA   CULTURE, URINE   PREGNANCY, URINE   LIPASE       All other labs were within normal range or not returned as of this dictation. EMERGENCY DEPARTMENT COURSE and DIFFERENTIAL DIAGNOSIS/ MEDICAL DECISION MAKING:   Vitals:    Vitals:    02/04/23 0243   BP: 138/86   Pulse: 89   Resp: 16   Temp: 98.1 °F (36.7 °C)   TempSrc: Oral   SpO2: 99%   Weight: 105 lb 9.6 oz (47.9 kg)   Height: 5' 4\" (1.626 m)         MDM      The patient presents with left lower quadrant abdominal pain that began 2 days ago radiating to the left flank. She is currently pain-free. On exam she has some mild vague discomfort in the left lower pelvic area but no localizing tenderness. No associated dysuria. No CVA tenderness. She is hemodynamically stable. She is afebrile. CT abdomen and pelvis without contrast was obtained as well as laboratory studies. Differential diagnosis would include urinary tract infection, ureteral stone, colitis, diverticulitis, ovarian cyst.  She denies any concern or suspicion for STD. No history of melena or hematochezia. Is this patient to be included in the SEP-1 Core Measure due to severe sepsis or septic shock? No   Exclusion criteria - the patient is NOT to be included for SEP-1 Core Measure due to:  2+ SIRS criteria are not met      REASSESSMENT/ MEDICAL DECISION MAKING          4:16 AM: Laboratory studies were reviewed.   White blood cell count is 4.4.  Hemoglobin 12.2. Creatinine 0.6. Lipase and liver enzymes were normal.  Urinalysis reveals moderate leukocytes with 10-20 white cells and  red cells and 2+ bacteria. Findings are consistent with possible cystitis. Culture is pending. The patient will be treated with Keflex 500 mg 3 times daily for 7 days. She does have evidence of bacterial vaginosis with presence of clue cells and will be treated with Flagyl 500 mg twice daily for 7 days. CT abdomen and pelvis without contrast was reviewed. No acute findings. No evidence of colitis or ureteral stone. The patient is stable for discharge. No current abdominal pain. Drink plenty of fluids. Follow-up with a primary care physician in 1 to 2 days for reexamination. Call today for an appointment. If condition worsens or new symptoms develop, return immediately to the emergency department. I am the primary attending of record. CRITICAL CARE TIME   Total Critical Care time was 0 minutes, excluding separately reportable procedures. There was a high probability of clinically significant/life threatening deterioration in the patient's condition which required my urgent intervention. CONSULTS:  None    PROCEDURES:  Unless otherwise noted below, none     Procedures      FINAL IMPRESSION      1. Acute abdominal pain    2. Acute cystitis without hematuria    3. Bacterial vaginosis    4.  Dysfunctional uterine bleeding          DISPOSITION/PLAN   DISPOSITION Decision To Discharge 02/04/2023 04:15:54 AM      PATIENT REFERRED TO:  Tristan Townsend  [address] [facility] [address]  [phone]    Call today      DISCHARGE MEDICATIONS:  New Prescriptions    CEPHALEXIN (KEFLEX) 500 MG CAPSULE    Take 1 capsule by mouth 3 times daily for 7 days    METRONIDAZOLE (FLAGYL) 500 MG TABLET    Take 1 tablet by mouth 2 times daily for 7 days     Controlled Substances Monitoring:     RX Monitoring 10/8/2018   Attestation The Prescription Monitoring Report for this patient was reviewed today. Periodic Controlled Substance Monitoring Possible medication side effects, risk of tolerance/dependence & alternative treatments discussed. (Please note that portions of this note were completed with a voice recognition program.  Efforts were made to edit the dictations but occasionally words are mis-transcribed. )    [id number] Clive Yo DO (electronically signed)  Attending Emergency Physician           Malissa Delaney DO  02/04/23 2262

## 2023-02-05 LAB — URINE CULTURE, ROUTINE: NORMAL

## 2023-02-06 LAB
C TRACH DNA GENITAL QL NAA+PROBE: NEGATIVE
N. GONORRHOEAE DNA: NEGATIVE

## 2024-11-12 ENCOUNTER — APPOINTMENT (OUTPATIENT)
Dept: GENERAL RADIOLOGY | Age: 34
End: 2024-11-12
Payer: COMMERCIAL

## 2024-11-12 ENCOUNTER — HOSPITAL ENCOUNTER (EMERGENCY)
Age: 34
Discharge: HOME | End: 2024-11-12
Attending: EMERGENCY MEDICINE
Payer: COMMERCIAL

## 2024-11-12 VITALS
WEIGHT: 179.89 LBS | DIASTOLIC BLOOD PRESSURE: 70 MMHG | TEMPERATURE: 98.4 F | BODY MASS INDEX: 30.71 KG/M2 | SYSTOLIC BLOOD PRESSURE: 113 MMHG | HEIGHT: 64 IN | RESPIRATION RATE: 14 BRPM | HEART RATE: 86 BPM | OXYGEN SATURATION: 100 %

## 2024-11-12 DIAGNOSIS — M71.22 BAKER CYST, LEFT: Primary | ICD-10-CM

## 2024-11-12 PROCEDURE — 99283 EMERGENCY DEPT VISIT LOW MDM: CPT

## 2024-11-12 PROCEDURE — 73562 X-RAY EXAM OF KNEE 3: CPT

## 2024-11-12 ASSESSMENT — PAIN DESCRIPTION - ORIENTATION: ORIENTATION: LEFT;INNER

## 2024-11-12 ASSESSMENT — ENCOUNTER SYMPTOMS
ABDOMINAL PAIN: 0
RESPIRATORY NEGATIVE: 1
SHORTNESS OF BREATH: 0
GASTROINTESTINAL NEGATIVE: 1

## 2024-11-12 ASSESSMENT — PAIN DESCRIPTION - LOCATION: LOCATION: KNEE

## 2024-11-12 ASSESSMENT — PAIN SCALES - GENERAL: PAINLEVEL_OUTOF10: 8

## 2024-11-12 ASSESSMENT — PAIN - FUNCTIONAL ASSESSMENT: PAIN_FUNCTIONAL_ASSESSMENT: 0-10

## 2024-11-12 ASSESSMENT — LIFESTYLE VARIABLES: HOW OFTEN DO YOU HAVE A DRINK CONTAINING ALCOHOL: NEVER

## 2024-11-12 ASSESSMENT — PAIN DESCRIPTION - DESCRIPTORS: DESCRIPTORS: SHARP

## 2024-11-13 NOTE — ED PROVIDER NOTES
[facility]  EMERGENCY DEPARTMENT ENCOUNTER        Pt Name: Orquidea [place]  MRN: [id number]  Birthdate 1990  Date of evaluation: 2024  Provider: Mariano Mabry MD  PCP: Tena Lan APRN - NP  Note Started: 11:11 PM EST 24    CHIEF COMPLAINT       Chief Complaint   Patient presents with    Leg Swelling     Pt reports exacerbation of Baker's cyst to L knee x2days. Pt states cyst has been drained via needle aspiration in prior occurrences. Pain 8/10 currently.       HISTORY OF PRESENT ILLNESS: 1 or more Elements     History from : Patient    Limitations to history : None    Orquidea Armstrong is a 34 y.o. female who presents for left posterior knee swelling for the last 2 days.  Patient states she has a history of Baker's cyst.  Has had it drained multiple times in the past orthopedics but has not seen orthopedic doctor for greater than 4 years.  Some pain with flexion extension walking but is able to walk.  No numbness or weakness.  No redness.  No fevers or chills.  No calf swelling.  No history of DVT or PE no shortness of breath or chest pain no other modifying factors    Nursing Notes were all reviewed and agreed with or any disagreements were addressed in the HPI.    REVIEW OF SYSTEMS :      Review of Systems   Constitutional: Negative.  Negative for chills and fever.   Respiratory: Negative.  Negative for shortness of breath.    Cardiovascular: Negative.  Negative for chest pain.   Gastrointestinal: Negative.  Negative for abdominal pain.   Musculoskeletal:  Positive for joint swelling. Negative for arthralgias.   Skin:  Negative for rash.   Neurological:  Negative for weakness and numbness.       Positives and Pertinent negatives as per HPI.     SURGICAL HISTORY     Past Surgical History:   Procedure Laterality Date     SECTION      COLECTOMY N/A 2022    LAPAROSCOPIC LYSIS OF ADHESIONS WITH SMALL BOWEL RESECTION performed by Geovanny

## 2024-11-13 NOTE — ED TRIAGE NOTES
Pt reports exacerbation of Baker's cyst to L knee x2days. Pt states cyst has been drained via needle aspiration in prior occurrences. Pain 8/10 currently.

## 2024-11-14 ENCOUNTER — OFFICE VISIT (OUTPATIENT)
Dept: ORTHOPEDIC SURGERY | Age: 34
End: 2024-11-14
Payer: COMMERCIAL

## 2024-11-14 VITALS — WEIGHT: 179 LBS | BODY MASS INDEX: 30.56 KG/M2 | RESPIRATION RATE: 16 BRPM | HEIGHT: 64 IN

## 2024-11-14 DIAGNOSIS — S83.92XA SPRAIN OF LEFT KNEE, UNSPECIFIED LIGAMENT, INITIAL ENCOUNTER: Primary | ICD-10-CM

## 2024-11-14 PROCEDURE — 99203 OFFICE O/P NEW LOW 30 MIN: CPT | Performed by: ORTHOPAEDIC SURGERY

## 2024-11-14 RX ORDER — IBUPROFEN 600 MG/1
600 TABLET, FILM COATED ORAL 2 TIMES DAILY WITH MEALS
Qty: 60 TABLET | Refills: 0 | Status: SHIPPED | OUTPATIENT
Start: 2024-11-14

## 2024-11-14 NOTE — PROGRESS NOTES
Orquidea Armstrong  [id number]  November 14, 2024    Chief Complaint   Patient presents with    New Patient     L knee        History: The patient is a 34-year-old female who is here for evaluation of her left knee.  The patient reportedly was treated back in 2020 for a similar issue with regards to her left knee.  She was doing rather well up until a few weeks ago.  She has noted increasing swelling and pain.  She has catching and popping in the knee as well.  She cannot recall a recent injury.    The patient's  past medical history, medications, allergies,  family history, social history, and have been reviewed, and dated and are recorded in the chart.  Pertinent items are noted in HPI.  Review of systems reviewed from Pertinent History Form dated on 11/14 and available in the patient's chart under the Media tab.     Vitals:  Resp 16   Ht 1.626 m (5' 4\")   Wt 81.2 kg (179 lb)   LMP 10/26/2024 (Exact Date)   BMI 30.73 kg/m²     Physical: Ms. Orquidea Armstrong appears well, she is in no apparent distress, she demonstrates appropriate mood & affect.He is alert and oriented to person, place and time. Examination of the left knee reveals medial joint line tenderness. There is a small effusion. Yanely's is positive medially. Range of motion is from 0 degrees extension to 115 degrees of flexion. Range of motion of the opposite knee is full. Anterior drawer and Lachman are negative. There is no instability with varus or valgus stressing of the knee. There is no pain with range of motion of the hips. Sensation is intact to light touch in the tibial, peroneal, sural, and saphenous nerve distributions bilaterally.  Both feet are well perfused and sensory is intact to feet equal and symmetric. Examination of the skin reveals no lesions or ulcerations.  There is no pain with range of motion of the hips.      X-rays: 3 views of the left knee obtained on 11/12 were extensively reviewed.  There is no evidence of fracture or

## 2024-11-15 ENCOUNTER — PATIENT MESSAGE (OUTPATIENT)
Dept: ORTHOPEDIC SURGERY | Age: 34
End: 2024-11-15

## 2024-11-15 DIAGNOSIS — F40.240 CLAUSTROPHOBIA: Primary | ICD-10-CM

## 2024-11-22 ENCOUNTER — TELEPHONE (OUTPATIENT)
Dept: ORTHOPEDIC SURGERY | Age: 34
End: 2024-11-22

## 2024-11-22 NOTE — TELEPHONE ENCOUNTER
Received: Valentin Barajas [facility] Clinical Support  Approval for MRI: of L Knee  Approval Letter in Media  Approved Facility [facility]  Approval Dates: 11.22.24 to 01.21.25  Auth #:  [id number]    Received insurance approval for MRI to be scheduled at [facility]. Patient may call [phone] to schedule. Once scheduled, patient should call our office back to make a follow-up appointment to go over the results.      I spoke to pt and let her know.

## 2024-11-22 NOTE — TELEPHONE ENCOUNTER
PT REQ CALL BACK TO OBTAIN STATUS OF MRI ORDER.    PT IS REQUESTING A CALL BACK TO BE UPDATED WITH MRI ORDER DETAILS.      PT CAN BE REACHED [phone]

## 2024-12-02 ENCOUNTER — HOSPITAL ENCOUNTER (OUTPATIENT)
Dept: VASCULAR LAB | Age: 34
Discharge: HOME | End: 2024-12-04
Attending: EMERGENCY MEDICINE
Payer: COMMERCIAL

## 2024-12-02 DIAGNOSIS — S83.92XA SPRAIN OF LEFT KNEE, UNSPECIFIED LIGAMENT, INITIAL ENCOUNTER: ICD-10-CM

## 2024-12-02 DIAGNOSIS — M71.22 BAKER CYST, LEFT: ICD-10-CM

## 2024-12-02 PROCEDURE — 93971 EXTREMITY STUDY: CPT | Performed by: SURGERY

## 2024-12-02 PROCEDURE — 93971 EXTREMITY STUDY: CPT

## 2024-12-03 RX ORDER — DIAZEPAM 5 MG/1
TABLET ORAL
Qty: 2 TABLET | Refills: 0 | Status: SHIPPED | OUTPATIENT
Start: 2024-12-03 | End: 2024-12-22

## 2024-12-03 RX ORDER — IBUPROFEN 600 MG/1
600 TABLET, FILM COATED ORAL 2 TIMES DAILY WITH MEALS
Qty: 60 TABLET | Refills: 0 | OUTPATIENT
Start: 2024-12-03

## 2024-12-05 ENCOUNTER — HOSPITAL ENCOUNTER (OUTPATIENT)
Dept: MRI IMAGING | Age: 34
Discharge: HOME | End: 2024-12-05
Attending: ORTHOPAEDIC SURGERY
Payer: COMMERCIAL

## 2024-12-05 DIAGNOSIS — S83.92XA SPRAIN OF LEFT KNEE, UNSPECIFIED LIGAMENT, INITIAL ENCOUNTER: ICD-10-CM

## 2024-12-05 PROCEDURE — 73721 MRI JNT OF LWR EXTRE W/O DYE: CPT

## 2024-12-10 ENCOUNTER — OFFICE VISIT (OUTPATIENT)
Dept: ORTHOPEDIC SURGERY | Age: 34
End: 2024-12-10
Payer: COMMERCIAL

## 2024-12-10 VITALS — BODY MASS INDEX: 30.56 KG/M2 | RESPIRATION RATE: 16 BRPM | WEIGHT: 179 LBS | HEIGHT: 64 IN

## 2024-12-10 DIAGNOSIS — Q27.30 ARTERIOVENOUS MALFORMATION: Primary | ICD-10-CM

## 2024-12-10 DIAGNOSIS — M25.562 LEFT KNEE PAIN, UNSPECIFIED CHRONICITY: ICD-10-CM

## 2024-12-10 PROCEDURE — G8427 DOCREV CUR MEDS BY ELIG CLIN: HCPCS | Performed by: ORTHOPAEDIC SURGERY

## 2024-12-10 PROCEDURE — G8417 CALC BMI ABV UP PARAM F/U: HCPCS | Performed by: ORTHOPAEDIC SURGERY

## 2024-12-10 PROCEDURE — 99214 OFFICE O/P EST MOD 30 MIN: CPT | Performed by: ORTHOPAEDIC SURGERY

## 2024-12-10 PROCEDURE — G8484 FLU IMMUNIZE NO ADMIN: HCPCS | Performed by: ORTHOPAEDIC SURGERY

## 2024-12-10 PROCEDURE — 4004F PT TOBACCO SCREEN RCVD TLK: CPT | Performed by: ORTHOPAEDIC SURGERY

## 2024-12-10 NOTE — PROGRESS NOTES
Orquidea Armstrong  [id number]  December 10, 2024    Chief Complaint   Patient presents with    Follow-up     L knee        History: The patient is a 34-year-old female who is here for evaluation of her left knee.  Her symptoms have improved.  She has been taking ibuprofen.  She occasionally has medial sided knee pain.  She is here to review the MRI results. She cannot recall a recent injury.    The patient's  past medical history, medications, allergies,  family history, social history, and have been reviewed, and dated and are recorded in the chart.  Pertinent items are noted in HPI.  Review of systems reviewed from Pertinent History Form dated on 11/14 and available in the patient's chart under the Media tab.     Vitals:  Resp 16   Ht 1.626 m (5' 4\")   Wt 81.2 kg (179 lb)   LMP 10/26/2024 (Exact Date)   BMI 30.73 kg/m²     Physical: Ms. Orquidea Armstrong appears well, she is in no apparent distress, she demonstrates appropriate mood & affect. She is alert and oriented to person, place and time. Examination of the left knee reveals medial joint line tenderness. There is a small effusion. Yanely's is negative. Range of motion is from 0 degrees extension to 120 degrees of flexion. Range of motion of the opposite knee is full. Anterior drawer and Lachman are negative. There is no instability with varus or valgus stressing of the knee. There is no pain with range of motion of the hips. Sensation is intact to light touch in the tibial, peroneal, sural, and saphenous nerve distributions bilaterally.  Both feet are well perfused and sensory is intact to feet equal and symmetric. Examination of the skin reveals no lesions or ulcerations.  There is no pain with range of motion of the hips.      X-rays: MRI of the left knee was extensively reviewed.  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  There is no evidence of ligamentous or meniscal injury. There is what appears to be an incidental

## 2025-01-02 ENCOUNTER — OFFICE VISIT (OUTPATIENT)
Age: 35
End: 2025-01-02

## 2025-01-02 VITALS
TEMPERATURE: 97.6 F | BODY MASS INDEX: 32.79 KG/M2 | HEART RATE: 82 BPM | WEIGHT: 191 LBS | DIASTOLIC BLOOD PRESSURE: 78 MMHG | OXYGEN SATURATION: 96 % | SYSTOLIC BLOOD PRESSURE: 118 MMHG

## 2025-01-02 DIAGNOSIS — R11.10 VOMITING, UNSPECIFIED VOMITING TYPE, UNSPECIFIED WHETHER NAUSEA PRESENT: Primary | ICD-10-CM

## 2025-01-02 LAB
BILIRUBIN, POC: NEGATIVE
BLOOD URINE, POC: NEGATIVE
CLARITY, POC: CLEAR
COLOR, POC: YELLOW
GLUCOSE URINE, POC: NEGATIVE MG/DL
KETONES, POC: NEGATIVE MG/DL
LEUKOCYTE EST, POC: NEGATIVE
NITRITE, POC: NEGATIVE
PH, POC: 5.5
PROTEIN, POC: NEGATIVE MG/DL
SPECIFIC GRAVITY, POC: 1.01
UROBILINOGEN, POC: NEGATIVE MG/DL

## 2025-01-02 RX ORDER — ONDANSETRON 4 MG/1
4 TABLET, ORALLY DISINTEGRATING ORAL 3 TIMES DAILY PRN
Qty: 21 TABLET | Refills: 0 | Status: SHIPPED | OUTPATIENT
Start: 2025-01-02

## 2025-01-02 RX ORDER — CLINDAMYCIN HYDROCHLORIDE 300 MG/1
300 CAPSULE ORAL 3 TIMES DAILY
COMMUNITY
Start: 2024-12-29

## 2025-01-02 ASSESSMENT — ENCOUNTER SYMPTOMS: COUGH: 1

## 2025-01-02 NOTE — PATIENT INSTRUCTIONS
Thank you for allowing us to care for you today and we hope you feel better soon  Clear liquids advance to BRAT diet as tolerated no milk or dairy products

## 2025-01-02 NOTE — PROGRESS NOTES
Orquidea Armstrong (:  1990) is a 34 y.o. female,New patient, here for evaluation of the following chief complaint(s):  Cough (Vomiting , diarrhea , HA , shortness of breath x /States she went to be seen else where on the  had covid and fluid in the ears , getting worse . )      ASSESSMENT/PLAN:    ICD-10-CM    1. Vomiting, unspecified vomiting type, unspecified whether nausea present  R11.10 POCT Urinalysis no Micro     ondansetron (ZOFRAN-ODT) 4 MG disintegrating tablet        Results for orders placed or performed in visit on 25   POCT Urinalysis no Micro   Result Value Ref Range    Color, UA YELLOW     Clarity, UA CLEAR     Glucose, UA POC NEGATIVE mg/dL    Bilirubin, UA NEGATIVE     Ketones, UA NEGATIVE mg/dL    Spec Grav, UA 1.015     Blood, UA POC NEGATIVE     pH, UA 5.5     Protein, UA POC NEGATIVE mg/dL    Urobilinogen, UA NEGATIVE mg/dL    Leukocytes, UA NEGATIVE     Nitrite, UA NEGATIVE         Dx Disposition:   Education and handout provided on diagnosis and management of symptoms.   AVS reviewed with patient. Follow up as needed in UC or with PCP for new or worsening symptoms.   Return if symptoms worsen or fail to improve.    SUBJECTIVE/OBJECTIVE:  Patient presents today with complaints of cough congestion vomiting and nausea that started 12 days ago seen elsewhere and told had fluid in ears and was positive for Covid      History provided by:  Patient   used: No    Cough        Vitals:    25 1801   BP: 118/78   Site: Right Upper Arm   Position: Sitting   Cuff Size: Medium Adult   Pulse: 82   Temp: 97.6 °F (36.4 °C)   TempSrc: Oral   SpO2: 96%   Weight: 86.6 kg (191 lb)       Review of Systems   Respiratory:  Positive for cough.        Physical Exam  Constitutional:       Appearance: Normal appearance.   HENT:      Head: Atraumatic.      Nose: Congestion present.      Mouth/Throat:      Mouth: Mucous membranes are moist.      Pharynx: Oropharynx is

## 2025-02-12 ENCOUNTER — APPOINTMENT (OUTPATIENT)
Dept: CT IMAGING | Age: 35
End: 2025-02-12
Payer: COMMERCIAL

## 2025-02-12 ENCOUNTER — HOSPITAL ENCOUNTER (EMERGENCY)
Age: 35
Discharge: HOME | End: 2025-02-12
Attending: EMERGENCY MEDICINE
Payer: COMMERCIAL

## 2025-02-12 VITALS
RESPIRATION RATE: 18 BRPM | HEIGHT: 63 IN | OXYGEN SATURATION: 100 % | DIASTOLIC BLOOD PRESSURE: 69 MMHG | TEMPERATURE: 98 F | BODY MASS INDEX: 34.49 KG/M2 | SYSTOLIC BLOOD PRESSURE: 109 MMHG | HEART RATE: 68 BPM | WEIGHT: 194.67 LBS

## 2025-02-12 DIAGNOSIS — R10.30 LOWER ABDOMINAL PAIN: Primary | ICD-10-CM

## 2025-02-12 LAB
ALBUMIN SERPL-MCNC: 4.2 G/DL (ref 3.4–5)
ALP SERPL-CCNC: 72 U/L (ref 40–129)
ALT SERPL-CCNC: 27 U/L (ref 10–40)
ANION GAP SERPL CALCULATED.3IONS-SCNC: 11 MMOL/L (ref 3–16)
AST SERPL-CCNC: 28 U/L (ref 15–37)
BACTERIA URNS QL MICRO: ABNORMAL /HPF
BASOPHILS # BLD: 0.1 K/UL (ref 0–0.2)
BASOPHILS NFR BLD: 0.9 %
BILIRUB DIRECT SERPL-MCNC: <0.1 MG/DL (ref 0–0.3)
BILIRUB INDIRECT SERPL-MCNC: NORMAL MG/DL (ref 0–1)
BILIRUB SERPL-MCNC: <0.2 MG/DL (ref 0–1)
BILIRUB UR QL STRIP.AUTO: NEGATIVE
BUN SERPL-MCNC: 12 MG/DL (ref 7–20)
CALCIUM SERPL-MCNC: 8.4 MG/DL (ref 8.3–10.6)
CHLORIDE SERPL-SCNC: 106 MMOL/L (ref 99–110)
CLARITY UR: CLEAR
CO2 SERPL-SCNC: 23 MMOL/L (ref 21–32)
COLOR UR: YELLOW
CREAT SERPL-MCNC: 0.6 MG/DL (ref 0.6–1.1)
DEPRECATED RDW RBC AUTO: 13.6 % (ref 12.4–15.4)
EOSINOPHIL # BLD: 0.2 K/UL (ref 0–0.6)
EOSINOPHIL NFR BLD: 2.3 %
EPI CELLS #/AREA URNS HPF: ABNORMAL /HPF (ref 0–5)
FLUAV RNA UPPER RESP QL NAA+PROBE: NEGATIVE
FLUBV AG NPH QL: NEGATIVE
GFR SERPLBLD CREATININE-BSD FMLA CKD-EPI: >90 ML/MIN/{1.73_M2}
GLUCOSE SERPL-MCNC: 98 MG/DL (ref 70–99)
GLUCOSE UR STRIP.AUTO-MCNC: NEGATIVE MG/DL
HCG SERPL QL: NEGATIVE
HCT VFR BLD AUTO: 38.4 % (ref 36–48)
HGB BLD-MCNC: 12.9 G/DL (ref 12–16)
HGB UR QL STRIP.AUTO: ABNORMAL
KETONES UR STRIP.AUTO-MCNC: NEGATIVE MG/DL
LEUKOCYTE ESTERASE UR QL STRIP.AUTO: NEGATIVE
LIPASE SERPL-CCNC: 30 U/L (ref 13–60)
LYMPHOCYTES # BLD: 1.8 K/UL (ref 1–5.1)
LYMPHOCYTES NFR BLD: 22.9 %
MCH RBC QN AUTO: 30.2 PG (ref 26–34)
MCHC RBC AUTO-ENTMCNC: 33.7 G/DL (ref 31–36)
MCV RBC AUTO: 89.4 FL (ref 80–100)
MONOCYTES # BLD: 0.7 K/UL (ref 0–1.3)
MONOCYTES NFR BLD: 8.8 %
NEUTROPHILS # BLD: 5.1 K/UL (ref 1.7–7.7)
NEUTROPHILS NFR BLD: 65.1 %
NITRITE UR QL STRIP.AUTO: NEGATIVE
PH UR STRIP.AUTO: 5.5 [PH] (ref 5–8)
PLATELET # BLD AUTO: 242 K/UL (ref 135–450)
PMV BLD AUTO: 8.6 FL (ref 5–10.5)
POTASSIUM SERPL-SCNC: 4 MMOL/L (ref 3.5–5.1)
PROT SERPL-MCNC: 7.1 G/DL (ref 6.4–8.2)
PROT UR STRIP.AUTO-MCNC: NEGATIVE MG/DL
RBC # BLD AUTO: 4.29 M/UL (ref 4–5.2)
RBC #/AREA URNS HPF: ABNORMAL /HPF (ref 0–4)
SARS-COV-2 RDRP RESP QL NAA+PROBE: NOT DETECTED
SODIUM SERPL-SCNC: 140 MMOL/L (ref 136–145)
SP GR UR STRIP.AUTO: >=1.03 (ref 1–1.03)
UA COMPLETE W REFLEX CULTURE PNL UR: ABNORMAL
UA DIPSTICK W REFLEX MICRO PNL UR: YES
URN SPEC COLLECT METH UR: ABNORMAL
UROBILINOGEN UR STRIP-ACNC: 0.2 E.U./DL
WBC # BLD AUTO: 7.9 K/UL (ref 4–11)
WBC #/AREA URNS HPF: ABNORMAL /HPF (ref 0–5)

## 2025-02-12 PROCEDURE — 81001 URINALYSIS AUTO W/SCOPE: CPT

## 2025-02-12 PROCEDURE — 99285 EMERGENCY DEPT VISIT HI MDM: CPT

## 2025-02-12 PROCEDURE — 85025 COMPLETE CBC W/AUTO DIFF WBC: CPT

## 2025-02-12 PROCEDURE — 84703 CHORIONIC GONADOTROPIN ASSAY: CPT

## 2025-02-12 PROCEDURE — 74177 CT ABD & PELVIS W/CONTRAST: CPT

## 2025-02-12 PROCEDURE — 80048 BASIC METABOLIC PNL TOTAL CA: CPT

## 2025-02-12 PROCEDURE — 6370000000 HC RX 637 (ALT 250 FOR IP): Performed by: EMERGENCY MEDICINE

## 2025-02-12 PROCEDURE — 36415 COLL VENOUS BLD VENIPUNCTURE: CPT

## 2025-02-12 PROCEDURE — 80076 HEPATIC FUNCTION PANEL: CPT

## 2025-02-12 PROCEDURE — 6360000004 HC RX CONTRAST MEDICATION: Performed by: EMERGENCY MEDICINE

## 2025-02-12 PROCEDURE — 87804 INFLUENZA ASSAY W/OPTIC: CPT

## 2025-02-12 PROCEDURE — 87635 SARS-COV-2 COVID-19 AMP PRB: CPT

## 2025-02-12 PROCEDURE — 83690 ASSAY OF LIPASE: CPT

## 2025-02-12 RX ORDER — CIPROFLOXACIN 500 MG/1
500 TABLET, FILM COATED ORAL 2 TIMES DAILY
Qty: 14 TABLET | Refills: 0 | Status: SHIPPED | OUTPATIENT
Start: 2025-02-12 | End: 2025-02-19

## 2025-02-12 RX ORDER — OXYCODONE AND ACETAMINOPHEN 5; 325 MG/1; MG/1
1 TABLET ORAL ONCE
Status: COMPLETED | OUTPATIENT
Start: 2025-02-12 | End: 2025-02-12

## 2025-02-12 RX ORDER — KETOROLAC TROMETHAMINE 10 MG/1
10 TABLET, FILM COATED ORAL EVERY 6 HOURS PRN
Qty: 20 TABLET | Refills: 0 | Status: SHIPPED | OUTPATIENT
Start: 2025-02-12

## 2025-02-12 RX ADMIN — OXYCODONE HYDROCHLORIDE AND ACETAMINOPHEN 1 TABLET: 5; 325 TABLET ORAL at 23:50

## 2025-02-12 RX ADMIN — OXYCODONE HYDROCHLORIDE AND ACETAMINOPHEN 1 TABLET: 5; 325 TABLET ORAL at 22:11

## 2025-02-12 RX ADMIN — IOMEPROL INJECTION 75 ML: 714 INJECTION, SOLUTION INTRAVASCULAR at 22:36

## 2025-02-12 ASSESSMENT — PAIN DESCRIPTION - LOCATION
LOCATION: ABDOMEN
LOCATION_2: BACK

## 2025-02-12 ASSESSMENT — PAIN SCALES - GENERAL
PAINLEVEL_OUTOF10: 7
PAINLEVEL_OUTOF10: 7

## 2025-02-12 ASSESSMENT — PAIN DESCRIPTION - PAIN TYPE: TYPE: ACUTE PAIN

## 2025-02-12 ASSESSMENT — PAIN DESCRIPTION - INTENSITY: RATING_2: 7

## 2025-02-12 ASSESSMENT — PAIN DESCRIPTION - ORIENTATION
ORIENTATION: LOWER
ORIENTATION_2: LEFT

## 2025-02-12 ASSESSMENT — PAIN - FUNCTIONAL ASSESSMENT: PAIN_FUNCTIONAL_ASSESSMENT_SITE2: ACTIVITIES ARE NOT PREVENTED

## 2025-02-13 NOTE — ED NOTES

## 2025-02-13 NOTE — ED PROVIDER NOTES
I PERSONALLY SAW THE PATIENT AND PERFORMED A SUBSTANTIVE PORTION OF THE VISIT INCLUDING ALL ASPECTS OF THE MEDICAL DECISION MAKING PROCESS.    [facility] EMERGENCY DEPARTMENT  EMERGENCY DEPARTMENT ENCOUNTER      Pt Name: Orquidea Armstrong  MRN: [id number]  Birthdate 1990  Date of evaluation: 2025  Provider: Zain Fox MD    CHIEF COMPLAINT       Chief Complaint   Patient presents with    Abdominal Pain    Vaginal Bleeding     Pt reports LMP 25 and still is bleeding.   Intermittent LLQ area and left flank/left lower back pain since 2/10 (no pain in abd or back/flank now).  No N/V/D.  No urinary symptoms.  Has appt with PCP on .  Reports significant other's son that lives with her has influenza.         HISTORY OF PRESENT ILLNESS    Orquidea Armstrong is a 34 y.o. female who presents to the emergency department with abdominal pain.  Lower abdominal pain.  Also intermittent bleeding for the last 2 weeks.  No chest pain or shortness of breath.  No back pain.  Positive for dysuria.  No other associated symptoms.  No rash.  No leg swelling.  No urine issues otherwise no cough.    Nursing Notes were reviewed. Including nursing noted for FM, Surgical History, Past Medical History, Social History, vitals, and allergies; agree with all.     REVIEW OF SYSTEMS       Review of Systems    Except as noted above the remainder of the review of systems was reviewed and negative.     PAST MEDICAL HISTORY     Past Medical History:   Diagnosis Date    Anxiety     Baker's cyst     On back of the knee    Depression        SURGICAL HISTORY       Past Surgical History:   Procedure Laterality Date     SECTION      COLECTOMY N/A 2022    LAPAROSCOPIC LYSIS OF ADHESIONS WITH SMALL BOWEL RESECTION performed by Geovanny Leahy MD at [facility] OR    TUBAL LIGATION         CURRENT MEDICATIONS       Discharge Medication List as of 2025 11:37 PM          ALLERGIES     Patient has no known

## 2025-02-13 NOTE — ED NOTES
Pt reports LMP 1/25/25 and still is bleeding.   Intermittent LLQ area and left flank/left lower back pain since 2/10 (no pain in abd or back/flank now).  No N/V/D.  No urinary symptoms.  Has appt with PCP on 2/27.  Reports significant other's son that lives with her has influenza.

## 2025-02-24 ENCOUNTER — OFFICE VISIT (OUTPATIENT)
Dept: VASCULAR SURGERY | Age: 35
End: 2025-02-24
Payer: COMMERCIAL

## 2025-02-24 VITALS — WEIGHT: 194 LBS | HEIGHT: 63 IN | BODY MASS INDEX: 34.38 KG/M2

## 2025-02-24 DIAGNOSIS — Q27.32: ICD-10-CM

## 2025-02-24 DIAGNOSIS — M79.605 LEFT LEG PAIN: Primary | ICD-10-CM

## 2025-02-24 PROCEDURE — G8417 CALC BMI ABV UP PARAM F/U: HCPCS | Performed by: SURGERY

## 2025-02-24 PROCEDURE — G8427 DOCREV CUR MEDS BY ELIG CLIN: HCPCS | Performed by: SURGERY

## 2025-02-24 PROCEDURE — 99244 OFF/OP CNSLTJ NEW/EST MOD 40: CPT | Performed by: SURGERY

## 2025-02-24 RX ORDER — IBUPROFEN 400 MG/1
800 TABLET, FILM COATED ORAL
COMMUNITY
Start: 2024-12-19

## 2025-02-24 RX ORDER — TRAMADOL HYDROCHLORIDE 50 MG/1
50 TABLET ORAL EVERY 6 HOURS PRN
Qty: 12 TABLET | Refills: 0 | Status: SHIPPED | OUTPATIENT
Start: 2025-02-24 | End: 2025-02-27

## 2025-02-24 ASSESSMENT — ENCOUNTER SYMPTOMS
RESPIRATORY NEGATIVE: 1
ALLERGIC/IMMUNOLOGIC NEGATIVE: 1
GASTROINTESTINAL NEGATIVE: 1
EYES NEGATIVE: 1

## 2025-02-24 NOTE — PROGRESS NOTES
Orquidea Armstrong (:  1990) is a 34 y.o. female, here for evaluation of the following chief complaint(s):  New Patient (Ref by Dr. Darling, possible AV malformation, has bakers cyst on left knee, pain since 2017 and Dr. Darling did a cta and said she had possible AV malformation)      Subjective   SUBJECTIVE/OBJECTIVE:  HPI  This Vanessa is a 34-year-old female who presents as referral by Dr. Darling for evaluation of symptomatic left medial thigh arteriovenous malformation.  Patient started having pain back in 2017.  She has been evaluated manage for a prior Baker's cyst of the left posterior knee by Dr. Darling though pain has continued.  She underwent CTA as well as MRI and duplex imaging which all show AVM in the distal sartorius muscle.  Patient states pain and tenderness do limit her activity.  She has been unable to have adequate pain control.  Patient states she has pain with long-term standing and with activity.    Review of Systems   Constitutional: Negative.    HENT: Negative.     Eyes: Negative.    Respiratory: Negative.     Cardiovascular:  Positive for leg swelling.   Gastrointestinal: Negative.    Endocrine: Negative.    Genitourinary: Negative.    Musculoskeletal: Negative.    Skin: Negative.    Allergic/Immunologic: Negative.    Neurological: Negative.    Hematological: Negative.    Psychiatric/Behavioral: Negative.            Objective   Physical Exam  Vitals and nursing note reviewed.   Constitutional:       General: She is not in acute distress.     Appearance: Normal appearance.   HENT:      Head: Normocephalic and atraumatic.   Cardiovascular:      Rate and Rhythm: Normal rate and regular rhythm.   Pulmonary:      Effort: Pulmonary effort is normal.   Abdominal:      General: There is no distension.      Palpations: Abdomen is soft.      Tenderness: There is no abdominal tenderness.   Musculoskeletal:         General: Normal range of motion.      Comments: Mild tenderness over left medial

## 2025-03-03 ENCOUNTER — TRANSCRIBE ORDERS (OUTPATIENT)
Dept: ADMINISTRATIVE | Age: 35
End: 2025-03-03

## 2025-03-03 DIAGNOSIS — G43.901 MIGRAINE WITH STATUS MIGRAINOSUS, NOT INTRACTABLE, UNSPECIFIED MIGRAINE TYPE: Primary | ICD-10-CM

## 2025-03-18 ENCOUNTER — HOSPITAL ENCOUNTER (OUTPATIENT)
Dept: MRI IMAGING | Age: 35
Discharge: HOME | End: 2025-03-18
Payer: COMMERCIAL

## 2025-03-18 DIAGNOSIS — G43.901 MIGRAINE WITH STATUS MIGRAINOSUS, NOT INTRACTABLE, UNSPECIFIED MIGRAINE TYPE: ICD-10-CM

## 2025-03-18 PROCEDURE — 70551 MRI BRAIN STEM W/O DYE: CPT

## 2025-05-10 ENCOUNTER — PATIENT MESSAGE (OUTPATIENT)
Dept: VASCULAR SURGERY | Age: 35
End: 2025-05-10

## 2025-05-15 ENCOUNTER — TELEPHONE (OUTPATIENT)
Dept: VASCULAR SURGERY | Age: 35
End: 2025-05-15

## 2025-05-15 NOTE — TELEPHONE ENCOUNTER
SILVIA:     I called the [facility] as the referral was sent in February but not reviewed until this week by Dr. Lane. According to the person checking this when I called, Orquidea was NOT called, but a note was attached to her chart that she needs to be seen at [facility], phone number [phone], as Dr. Lane does not do this surgery.     I spoke to the [facility] and they just found out about this when I called. They are going to triage her case and call her.  She also told me at the clinic they are booked out until October.      Is there somewhere else you want to send her?

## 2025-05-15 NOTE — TELEPHONE ENCOUNTER
I spoke with Orquidea over the phone to let her know [facility] said she needs to be seen by [facility] who will be in contact with her after triaging her referral.      Phone call completed.

## 2025-06-05 ENCOUNTER — OFFICE VISIT (OUTPATIENT)
Dept: SLEEP MEDICINE | Age: 35
End: 2025-06-05
Payer: COMMERCIAL

## 2025-06-05 VITALS
DIASTOLIC BLOOD PRESSURE: 60 MMHG | HEART RATE: 80 BPM | BODY MASS INDEX: 35.37 KG/M2 | WEIGHT: 199.6 LBS | RESPIRATION RATE: 18 BRPM | HEIGHT: 63 IN | SYSTOLIC BLOOD PRESSURE: 105 MMHG | OXYGEN SATURATION: 98 % | TEMPERATURE: 98 F

## 2025-06-05 DIAGNOSIS — G47.33 OSA (OBSTRUCTIVE SLEEP APNEA): Primary | ICD-10-CM

## 2025-06-05 DIAGNOSIS — G47.9 RESTLESS SLEEPER: ICD-10-CM

## 2025-06-05 DIAGNOSIS — R06.83 SNORING: ICD-10-CM

## 2025-06-05 DIAGNOSIS — G47.19 EXCESSIVE DAYTIME SLEEPINESS: ICD-10-CM

## 2025-06-05 DIAGNOSIS — R06.89 GASPING FOR BREATH: ICD-10-CM

## 2025-06-05 DIAGNOSIS — E66.811 OBESITY (BMI 30.0-34.9): ICD-10-CM

## 2025-06-05 PROCEDURE — G8427 DOCREV CUR MEDS BY ELIG CLIN: HCPCS | Performed by: PSYCHIATRY & NEUROLOGY

## 2025-06-05 PROCEDURE — G2211 COMPLEX E/M VISIT ADD ON: HCPCS | Performed by: PSYCHIATRY & NEUROLOGY

## 2025-06-05 PROCEDURE — 1036F TOBACCO NON-USER: CPT | Performed by: PSYCHIATRY & NEUROLOGY

## 2025-06-05 PROCEDURE — G8417 CALC BMI ABV UP PARAM F/U: HCPCS | Performed by: PSYCHIATRY & NEUROLOGY

## 2025-06-05 PROCEDURE — 99214 OFFICE O/P EST MOD 30 MIN: CPT | Performed by: PSYCHIATRY & NEUROLOGY

## 2025-06-05 RX ORDER — CARIPRAZINE 1.5 MG/1
CAPSULE, GELATIN COATED ORAL
COMMUNITY
Start: 2025-04-15

## 2025-06-05 ASSESSMENT — SLEEP AND FATIGUE QUESTIONNAIRES
HOW LIKELY ARE YOU TO NOD OFF OR FALL ASLEEP WHILE SITTING AND READING: SLIGHT CHANCE OF DOZING
HOW LIKELY ARE YOU TO NOD OFF OR FALL ASLEEP WHILE SITTING QUIETLY AFTER LUNCH WITHOUT ALCOHOL: WOULD NEVER DOZE
HOW LIKELY ARE YOU TO NOD OFF OR FALL ASLEEP IN A CAR, WHILE STOPPED FOR A FEW MINUTES IN TRAFFIC: WOULD NEVER DOZE
HOW LIKELY ARE YOU TO NOD OFF OR FALL ASLEEP WHEN YOU ARE A PASSENGER IN A CAR FOR AN HOUR WITHOUT A BREAK: SLIGHT CHANCE OF DOZING
HOW LIKELY ARE YOU TO NOD OFF OR FALL ASLEEP WHILE SITTING INACTIVE IN A PUBLIC PLACE: WOULD NEVER DOZE
HOW LIKELY ARE YOU TO NOD OFF OR FALL ASLEEP WHILE LYING DOWN TO REST IN THE AFTERNOON WHEN CIRCUMSTANCES PERMIT: HIGH CHANCE OF DOZING
HOW LIKELY ARE YOU TO NOD OFF OR FALL ASLEEP WHILE SITTING AND TALKING TO SOMEONE: WOULD NEVER DOZE
ESS TOTAL SCORE: 8
HOW LIKELY ARE YOU TO NOD OFF OR FALL ASLEEP WHILE WATCHING TV: HIGH CHANCE OF DOZING

## 2025-06-05 ASSESSMENT — ENCOUNTER SYMPTOMS
GASTROINTESTINAL NEGATIVE: 1
EYES NEGATIVE: 1
ALLERGIC/IMMUNOLOGIC NEGATIVE: 1
CHOKING: 1

## 2025-06-05 NOTE — PATIENT INSTRUCTIONS
Orders Placed This Encounter   Procedures    Baseline Diagnostic Sleep Study     Standing Status:   Future     Expected Date:   6/5/2025     Expiration Date:   6/5/2026     Adult or Pediatric:   Adult Study (>7 Years)     Location For Sleep Study:   [facility] Location:   [facility]    Sleep Study with PAP Titration     Standing Status:   Future     Expected Date:   6/5/2025     Expiration Date:   6/5/2026     Sleep Study Titration Type:   CPAP     Location For Sleep Study:   [facility] Location:   [facility]

## 2025-06-05 NOTE — PROGRESS NOTES
MD BROOKE Angulo Board Certified in Sleep Medicine  Certified inBehavioral Sleep Medicine  Board Certified in Neurology [facility]  [address]   [address]  [place]  [address]  P- [phone]   [facility]   [address]  [address]   [place], [place] [address]           [facility] [place] [facility]  [address] [address] [phone]    Subjective:     Patient ID: Orquidea Armstrong is a 34 y.o. female.    Chief Complaint   Patient presents with    Establish Care    Sleep Problem    Snoring       HPI:        Orquidea Armstrong is a 34 y.o. female referred by Riky SCHMITT for a sleep evaluation. She complains of snoring, snorting, choking, insomnia,tossing and turning, decreased concentration, excessive daytime sleepiness, feels sleepy during the day but she denies periods of not breathing, knees buckling with laughing, completely or partially paralyzed while falling asleep or waking up.  Symptoms began several years ago, gradually worsening since that time.   The patient's bed-partner confirmed the snoring without the stopped breathing at night..  SLEEP SCHEDULE: Goes to bed around 10 PM in the weekdays and 11 PM in the weekends. It usually takes the patient  minutes to fall asleep. The patient gets up 2-3 per night to go to the bathroom. The Patient finally gets up at 6:30 AM during the weekdays and 7 AM in the weekends. patient wakes up with dry mouth and sometimes morning headache.. the headache usually dull headache.  The patient has restless sleep with frequent arousals in addition to the Patient has significant daytime sleepiness. The Patient scored [place] Sleepiness Score: 8 on [place] Sleepiness Scale ( more than 10 is indicative of daytime sleepiness)and 39 in fatigue scale ( more than 36 is indicative of daytime fatigue). The patient takes no naps.  Previous

## (undated) DEVICE — TROCAR: Brand: KII SLEEVE

## (undated) DEVICE — GLOVE SURG SZ 8 L12IN FNGR THK79MIL GRN LTX FREE

## (undated) DEVICE — SOLUTION IV IRRIG POUR BRL 0.9% SODIUM CHL 2F7124

## (undated) DEVICE — 30977 SEE SHARP - ENHANCED INTRAOPERATIVE LAPAROSCOPE CLEANING & DEFOGGING: Brand: 30977 SEE SHARP - ENHANCED INTRAOPERATIVE LAPAROSCOPE CLEANING & DEFOGGING

## (undated) DEVICE — TOWEL,OR,DSP,ST,BLUE,STD,4/PK,20PK/CS: Brand: MEDLINE

## (undated) DEVICE — LEGGINGS, PAIR, CLEAR, STERILE: Brand: MEDLINE

## (undated) DEVICE — SUTURE VCRL + SZ 4-0 L18IN ABSRB UD L19MM PS-2 3/8 CIR PRIM VCP496H

## (undated) DEVICE — COVER LT HNDL BLU PLAS

## (undated) DEVICE — SPONGE GZ W4XL4IN COT 12 PLY TYP VII WVN C FLD DSGN

## (undated) DEVICE — GOWN,AURORA,NONREINF,RAGLAN,XXL,STERILE: Brand: MEDLINE

## (undated) DEVICE — SUTURE PERMAHAND SZ 3-0 L30IN NONABSORBABLE BLK SILK BRAID A304H

## (undated) DEVICE — INSUFFLATION NEEDLE TO ESTABLISH PNEUMOPERITONEUM.: Brand: INSUFFLATION NEEDLE

## (undated) DEVICE — SUTURE PERMAHAND SZ 3-0 L18IN NONABSORBABLE BLK L26MM SH C013D

## (undated) DEVICE — COVER,TABLE,77X90,STERILE: Brand: MEDLINE

## (undated) DEVICE — ACCESS PLATFORM FOR MINIMALLY INVASIVE SURGERY.: Brand: GELPORT® LAPAROSCOPIC  SYSTEM

## (undated) DEVICE — STAPLER INT L60MM REG TISS BLU B FRM 8 FIRING 2 ROW AUTO

## (undated) DEVICE — DRAPE,MINOR PROC,6X6 FEN, STER: Brand: MEDLINE

## (undated) DEVICE — GARMENT COMPR STD FOR 17IN CALF UNIF THER FLOTRN

## (undated) DEVICE — PENCIL ES L3M BTTN SWCH S STL HEX LOK BLDE ELECTRD HOLSTER

## (undated) DEVICE — SYRINGE 20ML LL S/C 50

## (undated) DEVICE — STAPLER INT L75MM CUT LN L73MM STPL LN L77MM BLU B FRM 8

## (undated) DEVICE — ELECTRODE BLDE L6.5IN CAUT EXT DISP

## (undated) DEVICE — SOLUTION PREP POVIDONE IOD FOR SKIN MUCOUS MEM PRIOR TO

## (undated) DEVICE — WOUND RETRACTOR AND PROTECTOR: Brand: ALEXIS WOUND PROTECTOR-RETRACTOR

## (undated) DEVICE — SPONGE LAP W18XL18IN WHT COT 4 PLY FLD STRUNG RADPQ DISP ST

## (undated) DEVICE — TROCAR: Brand: KII SHIELDED BLADED ACCESS SYSTEM

## (undated) DEVICE — Device

## (undated) DEVICE — SUTURE PDS II SZ 0 L60IN ABSRB VLT L48MM CTX 1/2 CIR Z990G

## (undated) DEVICE — SUTURE VCRL + SZ 3-0 L27IN ABSRB UD L26MM SH 1/2 CIR VCP416H

## (undated) DEVICE — GENERAL LAPAROSCOPY: Brand: MEDLINE INDUSTRIES, INC.

## (undated) DEVICE — COVER,MAYO STAND,STERILE: Brand: MEDLINE

## (undated) DEVICE — RELOAD STPL L75MM OPN H3.8MM CLS 1.5MM WIRE DIA0.2MM REG

## (undated) DEVICE — SET INSUF TUBE HEAT ISO CONN DISP

## (undated) DEVICE — 3M™ IOBAN™ 2 ANTIMICROBIAL INCISE DRAPE 6650EZ: Brand: IOBAN™ 2

## (undated) DEVICE — HYPODERMIC SAFETY NEEDLE: Brand: MAGELLAN

## (undated) DEVICE — GLOVE ORANGE PI 7 1/2   MSG9075

## (undated) DEVICE — STAPLER SKIN H3.9MM WIRE DIA0.58MM CRWN 6.9MM 35 STPL FIX

## (undated) DEVICE — PAD PT POS 36 IN SURGYPAD DISP

## (undated) DEVICE — SUTURE PERMAHAND SZ 2-0 L18IN NONABSORBABLE BLK L26MM SH C012D

## (undated) DEVICE — SUTURE PERMAHAND SZ 3-0 L30IN NONABSORBABLE BLK SH L26MM K832H

## (undated) DEVICE — SUTURE VCRL + SZ 2-0 L27IN ABSRB CLR CT-1 1/2 CIR TAPERCUT VCP259H

## (undated) DEVICE — GOWN SIRUS NONREIN XL W/TWL: Brand: MEDLINE INDUSTRIES, INC.

## (undated) DEVICE — SYRINGE IRRIG 60ML SFT PLIABLE BLB EZ TO GRP 1 HND USE W/

## (undated) DEVICE — SYSTEM SKIN CLSR 60CM 2-OCTYL CYNOACRLT W/ MESH DISPNS

## (undated) DEVICE — STERILE SURGICAL LUBRICANT, METAL TUBE: Brand: SURGILUBE